# Patient Record
Sex: FEMALE | NOT HISPANIC OR LATINO | URBAN - METROPOLITAN AREA
[De-identification: names, ages, dates, MRNs, and addresses within clinical notes are randomized per-mention and may not be internally consistent; named-entity substitution may affect disease eponyms.]

---

## 2019-10-15 ENCOUNTER — EMERGENCY (EMERGENCY)
Facility: HOSPITAL | Age: 44
LOS: 1 days | Discharge: HOME | End: 2019-10-15
Attending: HOSPITALIST | Admitting: HOSPITALIST
Payer: COMMERCIAL

## 2019-10-15 VITALS
TEMPERATURE: 98 F | RESPIRATION RATE: 16 BRPM | DIASTOLIC BLOOD PRESSURE: 70 MMHG | HEART RATE: 74 BPM | OXYGEN SATURATION: 100 % | SYSTOLIC BLOOD PRESSURE: 128 MMHG

## 2019-10-15 PROCEDURE — 99284 EMERGENCY DEPT VISIT MOD MDM: CPT | Mod: 25

## 2019-10-15 PROCEDURE — 93010 ELECTROCARDIOGRAM REPORT: CPT

## 2019-10-15 RX ORDER — SODIUM CHLORIDE 9 MG/ML
1000 INJECTION INTRAMUSCULAR; INTRAVENOUS; SUBCUTANEOUS ONCE
Refills: 0 | Status: COMPLETED | OUTPATIENT
Start: 2019-10-15 | End: 2019-10-15

## 2019-10-15 RX ORDER — ONDANSETRON 8 MG/1
4 TABLET, FILM COATED ORAL ONCE
Refills: 0 | Status: COMPLETED | OUTPATIENT
Start: 2019-10-15 | End: 2019-10-15

## 2019-10-15 RX ORDER — MECLIZINE HCL 12.5 MG
25 TABLET ORAL ONCE
Refills: 0 | Status: COMPLETED | OUTPATIENT
Start: 2019-10-15 | End: 2019-10-15

## 2019-10-15 RX ADMIN — Medication 25 MILLIGRAM(S): at 23:34

## 2019-10-15 RX ADMIN — SODIUM CHLORIDE 1000 MILLILITER(S): 9 INJECTION INTRAMUSCULAR; INTRAVENOUS; SUBCUTANEOUS at 23:33

## 2019-10-15 RX ADMIN — ONDANSETRON 4 MILLIGRAM(S): 8 TABLET, FILM COATED ORAL at 23:34

## 2019-10-15 NOTE — ED PROVIDER NOTE - OBJECTIVE STATEMENT
44F with hx of hypothyroidism p/w c/o dizziness and vomiting. patient states she feels like she is spinning. has 2 brief episodes earlier today which resolved spontaneously. symptoms returned this evening, felt very dizzy and vomited 4 times. symptoms worse when walking and turning her head, improved with eyes closed. no recent uri, ringing in the ears, similar sx in the past. denies any cp, sob.

## 2019-10-15 NOTE — ED PROVIDER NOTE - PATIENT PORTAL LINK FT
You can access the FollowMyHealth Patient Portal offered by  by registering at the following website: [email or web address]. By joining [facility]’s FollowMyHealth portal, you will also be able to view your health information using other applications (apps) compatible with our system.

## 2019-10-15 NOTE — ED PROVIDER NOTE - NEUROLOGICAL, MLM
Alert and oriented, no focal deficits, no motor or sensory deficits. increased dizziness when turning her head to the right

## 2019-10-15 NOTE — ED ADULT TRIAGE NOTE - CHIEF COMPLAINT QUOTE
Pt comes in for c/o dizziness and vomiting that began about 6p tonight, pt denies any sickness prior to today. Pt reporting feeling chills and is afebrile in triage. Pt appears in no acute distress, vs as noted and EKG in progress.

## 2019-10-15 NOTE — ED PROVIDER NOTE - NSFOLLOWUPINSTRUCTIONS_ED_ALL_ED_FT
Advance activity as tolerated.  Continue all previously prescribed medications as directed unless otherwise instructed.  Follow up with your primary care physician in 48-72 hours- bring copies of your results.  Return to the ER for worsening or persistent symptoms, and/or ANY NEW OR CONCERNING SYMPTOMS. If you have issues obtaining follow up, please call: [phone] to obtain a doctor or specialist who takes your insurance in your area.  You may call [phone] to make an appointment with the internal medicine clinic.

## 2019-10-15 NOTE — ED PROVIDER NOTE - PROGRESS NOTE DETAILS
Sign out report received from Dr. Carballo, patient feeling much better, VSS, in NAD, pt stale to be discharged from ED. Kait: I personally saw the patient with the PA, and completed the key components of the history and physical exam. I then discussed the management plan with the PA.

## 2019-10-16 VITALS
OXYGEN SATURATION: 100 % | HEART RATE: 68 BPM | TEMPERATURE: 98 F | DIASTOLIC BLOOD PRESSURE: 60 MMHG | SYSTOLIC BLOOD PRESSURE: 105 MMHG | RESPIRATION RATE: 16 BRPM

## 2019-10-16 RX ORDER — MECLIZINE HCL 12.5 MG
1 TABLET ORAL
Qty: 15 | Refills: 0
Start: 2019-10-16 | End: 2019-10-20

## 2019-11-12 PROBLEM — E03.9 HYPOTHYROIDISM, UNSPECIFIED: Chronic | Status: ACTIVE | Noted: 2019-10-15

## 2019-11-13 ENCOUNTER — APPOINTMENT (OUTPATIENT)
Dept: PAIN MANAGEMENT | Facility: CLINIC | Age: 44
End: 2019-11-13
Payer: COMMERCIAL

## 2019-11-13 VITALS
SYSTOLIC BLOOD PRESSURE: 131 MMHG | HEIGHT: 65 IN | DIASTOLIC BLOOD PRESSURE: 82 MMHG | BODY MASS INDEX: 28.16 KG/M2 | HEART RATE: 67 BPM | WEIGHT: 169 LBS

## 2019-11-13 DIAGNOSIS — R51 HEADACHE: ICD-10-CM

## 2019-11-13 PROCEDURE — 99204 OFFICE O/P NEW MOD 45 MIN: CPT

## 2019-11-13 RX ORDER — LEVOTHYROXINE SODIUM 0.07 MG/1
75 TABLET ORAL
Refills: 0 | Status: ACTIVE | COMMUNITY

## 2019-11-15 NOTE — HISTORY OF PRESENT ILLNESS
[FreeTextEntry1] : 43 y/o female for one month. Never had headaches in past. One month ago without incident - while urinating in bathroom - felt dx - vertigo - vomited multiple times. Went to ER DX Vertigo no imaging. The following day she began co bad headaches - top of head throbbing, no nausea, vomiting, pos for photo and phonophobia. Associated neck pain. \par Took topamax for 8 days at 50 mgs and rizatriptan 10 mgs tid.\par No aura. No concussion\par More recently patient; reports sx s unchanged. \par MRI / A brain neg.\par \par No prior ho headaches\par Lives with  and children\par No stressors\par Menses q month\par No family ho migraines

## 2019-11-15 NOTE — ASSESSMENT
[FreeTextEntry1] : Chronic migraines\par MRI/A neg\par Non focal exam\par Would rec tizanidine 2 mgs qhs to be increased depending upon her response. Advised of AE's. \par DC rizatriptan

## 2019-11-15 NOTE — PHYSICAL EXAM
[General Appearance - Alert] : alert [Oriented To Time, Place, And Person] : oriented to person, place, and time [General Appearance - In No Acute Distress] : in no acute distress [Impaired Insight] : insight and judgment were intact [Affect] : the affect was normal [Person] : oriented to person [Place] : oriented to place [Time] : oriented to time [Concentration Intact] : normal concentrating ability [Naming Objects] : no difficulty naming common objects [Fluency] : fluency intact [Comprehension] : comprehension intact [Past History] : adequate knowledge of personal past history [Cranial Nerves Optic (II)] : visual acuity intact bilaterally,  visual fields full to confrontation, pupils equal round and reactive to light [Cranial Nerves Oculomotor (III)] : extraocular motion intact [Cranial Nerves Trigeminal (V)] : facial sensation intact symmetrically [Cranial Nerves Facial (VII)] : face symmetrical [Cranial Nerves Vestibulocochlear (VIII)] : hearing was intact bilaterally [Cranial Nerves Glossopharyngeal (IX)] : tongue and palate midline [Cranial Nerves Accessory (XI - Cranial And Spinal)] : head turning and shoulder shrug symmetric [Cranial Nerves Hypoglossal (XII)] : there was no tongue deviation with protrusion [Motor Tone] : muscle tone was normal in all four extremities [Motor Strength] : muscle strength was normal in all four extremities [No Muscle Atrophy] : normal bulk in all four extremities [Sensation Tactile Decrease] : light touch was intact [Abnormal Walk] : normal gait [Balance] : balance was intact [2+] : Ankle jerk left 2+ [Nail Clubbing] : no clubbing  or cyanosis of the fingernails [Sclera] : the sclera and conjunctiva were normal [Outer Ear] : the ears and nose were normal in appearance [Neck Appearance] : the appearance of the neck was normal [] : no respiratory distress [Past-pointing] : there was no past-pointing [Tremor] : no tremor present [Plantar Reflex Right Only] : normal on the right [Plantar Reflex Left Only] : normal on the left

## 2019-11-20 ENCOUNTER — APPOINTMENT (OUTPATIENT)
Dept: NEUROLOGY | Facility: CLINIC | Age: 44
End: 2019-11-20
Payer: COMMERCIAL

## 2019-11-20 PROCEDURE — 93886 INTRACRANIAL COMPLETE STUDY: CPT

## 2019-11-20 PROCEDURE — 93892 TCD EMBOLI DETECT W/O INJ: CPT

## 2019-12-11 ENCOUNTER — APPOINTMENT (OUTPATIENT)
Dept: PAIN MANAGEMENT | Facility: CLINIC | Age: 44
End: 2019-12-11
Payer: COMMERCIAL

## 2019-12-11 PROCEDURE — 99214 OFFICE O/P EST MOD 30 MIN: CPT

## 2019-12-15 NOTE — ASSESSMENT
[FreeTextEntry1] : Chronic migraines\par Non focal exam\par Would rec increase tizanidine 2 mgs 1 bid  to be increased depending upon her response. Advised of AE's. \par Will try PT.\par

## 2019-12-15 NOTE — PHYSICAL EXAM
[General Appearance - Alert] : alert [General Appearance - In No Acute Distress] : in no acute distress [Oriented To Time, Place, And Person] : oriented to person, place, and time [Impaired Insight] : insight and judgment were intact [Affect] : the affect was normal [Person] : oriented to person [Place] : oriented to place [Time] : oriented to time [Concentration Intact] : normal concentrating ability [Naming Objects] : no difficulty naming common objects [Comprehension] : comprehension intact [Fluency] : fluency intact [Past History] : adequate knowledge of personal past history [Cranial Nerves Optic (II)] : visual acuity intact bilaterally,  visual fields full to confrontation, pupils equal round and reactive to light [Cranial Nerves Oculomotor (III)] : extraocular motion intact [Cranial Nerves Trigeminal (V)] : facial sensation intact symmetrically [Cranial Nerves Facial (VII)] : face symmetrical [Cranial Nerves Vestibulocochlear (VIII)] : hearing was intact bilaterally [Cranial Nerves Glossopharyngeal (IX)] : tongue and palate midline [Cranial Nerves Hypoglossal (XII)] : there was no tongue deviation with protrusion [Motor Tone] : muscle tone was normal in all four extremities [Cranial Nerves Accessory (XI - Cranial And Spinal)] : head turning and shoulder shrug symmetric [No Muscle Atrophy] : normal bulk in all four extremities [Motor Strength] : muscle strength was normal in all four extremities [Sensation Tactile Decrease] : light touch was intact [Abnormal Walk] : normal gait [Balance] : balance was intact [2+] : Ankle jerk right 2+ [Sclera] : the sclera and conjunctiva were normal [Outer Ear] : the ears and nose were normal in appearance [Nail Clubbing] : no clubbing  or cyanosis of the fingernails [Neck Appearance] : the appearance of the neck was normal [] : no rash [Past-pointing] : there was no past-pointing [Tremor] : no tremor present [Plantar Reflex Right Only] : normal on the right [Plantar Reflex Left Only] : normal on the left

## 2019-12-15 NOTE — HISTORY OF PRESENT ILLNESS
[FreeTextEntry1] : Patient reports headaches have slightly improved with current regime. Instead of qd headache now its on and off ... more in morning. Medrol has no effect

## 2020-02-13 ENCOUNTER — APPOINTMENT (OUTPATIENT)
Dept: PAIN MANAGEMENT | Facility: CLINIC | Age: 45
End: 2020-02-13
Payer: COMMERCIAL

## 2020-02-13 VITALS
SYSTOLIC BLOOD PRESSURE: 116 MMHG | BODY MASS INDEX: 28.32 KG/M2 | HEIGHT: 65 IN | HEART RATE: 93 BPM | WEIGHT: 170 LBS | DIASTOLIC BLOOD PRESSURE: 70 MMHG

## 2020-02-13 PROCEDURE — 99214 OFFICE O/P EST MOD 30 MIN: CPT

## 2020-02-13 NOTE — PHYSICAL EXAM
[General Appearance - Alert] : alert [Affect] : the affect was normal [Person] : oriented to person [Place] : oriented to place [Time] : oriented to time [Sclera] : the sclera and conjunctiva were normal [Outer Ear] : the ears and nose were normal in appearance [Neck Appearance] : the appearance of the neck was normal [] : no rash

## 2020-02-16 NOTE — HISTORY OF PRESENT ILLNESS
[FreeTextEntry1] : Patient reports headaches are much better since PT. No new medical problems. Off meds

## 2020-02-16 NOTE — ASSESSMENT
[FreeTextEntry1] : Chronic migraines\par Non focal exam\par Will advise patient to initiate Pamelor 10mgs qhs   \par Advised of AE

## 2021-01-07 ENCOUNTER — APPOINTMENT (OUTPATIENT)
Dept: SURGICAL ONCOLOGY | Facility: CLINIC | Age: 46
End: 2021-01-07
Payer: COMMERCIAL

## 2021-01-07 VITALS
WEIGHT: 175 LBS | BODY MASS INDEX: 29.16 KG/M2 | OXYGEN SATURATION: 99 % | DIASTOLIC BLOOD PRESSURE: 83 MMHG | RESPIRATION RATE: 16 BRPM | HEIGHT: 65 IN | SYSTOLIC BLOOD PRESSURE: 134 MMHG | HEART RATE: 86 BPM

## 2021-01-07 PROCEDURE — 99072 ADDL SUPL MATRL&STAF TM PHE: CPT

## 2021-01-07 PROCEDURE — 99244 OFF/OP CNSLTJ NEW/EST MOD 40: CPT

## 2021-01-07 NOTE — CONSULT LETTER
[Dear  ___] : Dear  [unfilled], [Consult Letter:] : I had the pleasure of evaluating your patient, [unfilled]. [Please see my note below.] : Please see my note below. [Consult Closing:] : Thank you very much for allowing me to participate in the care of this patient.  If you have any questions, please do not hesitate to contact me. [Sincerely,] : Sincerely, [FreeTextEntry2] : Nelida Bruce MD [FreeTextEntry3] : Vladimir Miller MD\par Surgical Oncology\par [facility]\par Office: [phone]\par Cell: [phone]\par

## 2021-01-07 NOTE — PHYSICAL EXAM
[Normal Supraclavicular Lymph Nodes] : normal supraclavicular lymph nodes [Normal Axillary Lymph Nodes] : normal axillary lymph nodes [Normal] : oriented to person, place and time, with appropriate affect [FreeTextEntry1] : . [Normal] : normal breast inspection and palpation of axillas

## 2021-01-07 NOTE — HISTORY OF PRESENT ILLNESS
[de-identified] : Essence is a pleasant 45 year-old female here for an initial consultation.  She was previously seen by a breast surgeon in 2013 for evaluation of a left breast mass, ultimately biopsied and found to be consistent with a fibroadenoma.  Most recent breast imaging was completed on 11/25/20.  There was no mammographic evidence of malignancy.  Ultrasound revealed a 1.1 cm solid nodule in the left breast at 12:00, 1 cmfn, previously measuring 9 mm.  Given interval enlargement, ultrasound-guided biopsy is recommended (BIRADS 4).  Unclear if this corresponds to the previously biopsied nodule.\par \par Her past medical history includes chronic migraines, cervical spine pain and hypothyroidism.  She denies any family history of breast or ovarian cancer.  She denies alcohol or tobacco use.  She denies any palpable breast mass, nipple discharge, inversion, pain or skin changes.

## 2021-01-07 NOTE — OB HISTORY
[Menarche Age ____] : menarche age [unfilled] [Definite ___ (Date)] : the last menstrual period was [unfilled] [Total Preg ___] : G[unfilled] [Live Births ___] : P[unfilled]  [FreeTextEntry2] : age 24 at first pregnancy

## 2021-01-07 NOTE — ASSESSMENT
[FreeTextEntry1] : We discussed the need for an ultrasound-guided core needle biopsy left breast BI-RADS 4 findings.  We will arrange for this to occur. Essence will contact me upon completion discuss pertinent findings.

## 2021-01-22 ENCOUNTER — NON-APPOINTMENT (OUTPATIENT)
Age: 46
End: 2021-01-22

## 2021-11-29 ENCOUNTER — APPOINTMENT (OUTPATIENT)
Dept: SURGICAL ONCOLOGY | Facility: CLINIC | Age: 46
End: 2021-11-29
Payer: MEDICAID

## 2021-11-29 VITALS
HEART RATE: 75 BPM | SYSTOLIC BLOOD PRESSURE: 124 MMHG | TEMPERATURE: 98.1 F | DIASTOLIC BLOOD PRESSURE: 74 MMHG | HEIGHT: 65 IN | WEIGHT: 170 LBS | BODY MASS INDEX: 28.32 KG/M2 | RESPIRATION RATE: 16 BRPM | OXYGEN SATURATION: 95 %

## 2021-11-29 PROCEDURE — 99214 OFFICE O/P EST MOD 30 MIN: CPT

## 2021-11-29 NOTE — HISTORY OF PRESENT ILLNESS
[de-identified] : Essence is a pleasant 46 year-old female here for a follow up. She was previously seen by a breast surgeon in 2013 for evaluation of a left breast mass, ultimately biopsied and found to be consistent with a fibroadenoma.  Most recent breast imaging was completed on 11/25/20.  There was no mammographic evidence of malignancy.  Ultrasound revealed a 1.1 cm solid nodule in the left breast at 12:00, 1 cmfn, previously measuring 9 mm. Given interval enlargement, ultrasound-guided biopsy is recommended (BIRADS 4).  Unclear if this corresponds to the previously biopsied nodule.\par \par I reviewed her care with a breast radiologist, Dr. Correa, lesion has been stable when compared to 2015 imaging. Advised that there would be no need for tissue sampling and recommended repeat US in 6 months. \par \par She reports having a  left mammogram in August 2021 with dense breasts limiting sensitive of exam, no change from prior study dated 11/25/20, no mammographic evidence of malignancy in left breast, BI-RADS 2. Left breast ultrasound revealed, 1.1 cm shadowing solid nodule at left 12:00, US-Guided biopsy suggested, BI-RADS 4. \par \par Her past medical history includes chronic migraines, cervical spine pain and hypothyroidism.  She denies any family history of breast or ovarian cancer.  She denies alcohol or tobacco use.  She denies any palpable breast mass, nipple discharge, inversion, pain or skin changes.

## 2021-11-29 NOTE — PHYSICAL EXAM
[Normal] : supple, no neck mass and thyroid not enlarged [Normal Supraclavicular Lymph Nodes] : normal supraclavicular lymph nodes [Normal Axillary Lymph Nodes] : normal axillary lymph nodes [Normal] : oriented to person, place and time, with appropriate affect [FreeTextEntry1] : SM present during exam.  [de-identified] : Complete breast exam performed in supine and upright positions. No palpable masses, tenderness, nipple discharge, inversion, deviation or enlarged axillary or supraclavicular lymph nodes.

## 2021-11-29 NOTE — ASSESSMENT
[FreeTextEntry1] : We discussed the need for right-sided breast imaging with a mammogram and sonogram to be completed in the near future.  Repeat imaging completed at [facility] is again recommending an ultrasound-guided core biopsy of the left breast.  Essence and her son will contact me upon completion of the imaging and biopsy to discuss pertinent findings.

## 2021-11-29 NOTE — CONSULT LETTER
[Dear  ___] : Dear  [unfilled], [Consult Letter:] : I had the pleasure of evaluating your patient, [unfilled]. [Please see my note below.] : Please see my note below. [Consult Closing:] : Thank you very much for allowing me to participate in the care of this patient.  If you have any questions, please do not hesitate to contact me. [Sincerely,] : Sincerely, [FreeTextEntry2] : Nelida Bruce MD [FreeTextEntry3] : Vladimir Miller MD\par Surgical Oncology\par [facility]\par Office: [phone]\par Cell: [phone]\par

## 2022-01-10 ENCOUNTER — RESULT REVIEW (OUTPATIENT)
Age: 47
End: 2022-01-10

## 2022-01-10 ENCOUNTER — OUTPATIENT (OUTPATIENT)
Dept: OUTPATIENT SERVICES | Facility: HOSPITAL | Age: 47
LOS: 1 days | End: 2022-01-10
Payer: MEDICAID

## 2022-01-10 ENCOUNTER — APPOINTMENT (OUTPATIENT)
Dept: ULTRASOUND IMAGING | Facility: IMAGING CENTER | Age: 47
End: 2022-01-10
Payer: MEDICAID

## 2022-01-10 DIAGNOSIS — N63.20 UNSPECIFIED LUMP IN THE LEFT BREAST, UNSPECIFIED QUADRANT: ICD-10-CM

## 2022-01-10 PROCEDURE — 77065 DX MAMMO INCL CAD UNI: CPT

## 2022-01-10 PROCEDURE — 88305 TISSUE EXAM BY PATHOLOGIST: CPT

## 2022-01-10 PROCEDURE — 19083 BX BREAST 1ST LESION US IMAG: CPT

## 2022-01-10 PROCEDURE — 19083 BX BREAST 1ST LESION US IMAG: CPT | Mod: LT

## 2022-01-10 PROCEDURE — 88305 TISSUE EXAM BY PATHOLOGIST: CPT | Mod: 26

## 2022-01-10 PROCEDURE — 77065 DX MAMMO INCL CAD UNI: CPT | Mod: 26,LT

## 2022-01-10 PROCEDURE — A4648: CPT

## 2022-01-11 LAB — SURGICAL PATHOLOGY STUDY: SIGNIFICANT CHANGE UP

## 2022-03-09 ENCOUNTER — RESULT REVIEW (OUTPATIENT)
Age: 47
End: 2022-03-09

## 2022-03-09 ENCOUNTER — OUTPATIENT (OUTPATIENT)
Dept: OUTPATIENT SERVICES | Facility: HOSPITAL | Age: 47
LOS: 1 days | End: 2022-03-09
Payer: MEDICAID

## 2022-03-09 ENCOUNTER — APPOINTMENT (OUTPATIENT)
Dept: ULTRASOUND IMAGING | Facility: IMAGING CENTER | Age: 47
End: 2022-03-09
Payer: MEDICAID

## 2022-03-09 ENCOUNTER — APPOINTMENT (OUTPATIENT)
Dept: MAMMOGRAPHY | Facility: IMAGING CENTER | Age: 47
End: 2022-03-09
Payer: MEDICAID

## 2022-03-09 DIAGNOSIS — Z00.8 ENCOUNTER FOR OTHER GENERAL EXAMINATION: ICD-10-CM

## 2022-03-09 PROCEDURE — 76641 ULTRASOUND BREAST COMPLETE: CPT

## 2022-03-09 PROCEDURE — 76641 ULTRASOUND BREAST COMPLETE: CPT | Mod: 26,RT

## 2022-03-09 PROCEDURE — 77063 BREAST TOMOSYNTHESIS BI: CPT | Mod: 26,52

## 2022-03-09 PROCEDURE — 77067 SCR MAMMO BI INCL CAD: CPT | Mod: 26,52

## 2022-03-09 PROCEDURE — G0279: CPT

## 2022-03-09 PROCEDURE — 77067 SCR MAMMO BI INCL CAD: CPT

## 2022-03-09 PROCEDURE — 77063 BREAST TOMOSYNTHESIS BI: CPT

## 2022-03-09 PROCEDURE — 77065 DX MAMMO INCL CAD UNI: CPT

## 2022-05-04 ENCOUNTER — NON-APPOINTMENT (OUTPATIENT)
Age: 47
End: 2022-05-04

## 2022-06-08 ENCOUNTER — NON-APPOINTMENT (OUTPATIENT)
Age: 47
End: 2022-06-08

## 2022-09-12 ENCOUNTER — RESULT REVIEW (OUTPATIENT)
Age: 47
End: 2022-09-12

## 2023-03-13 ENCOUNTER — APPOINTMENT (OUTPATIENT)
Dept: OBGYN | Facility: CLINIC | Age: 48
End: 2023-03-13
Payer: COMMERCIAL

## 2023-03-13 VITALS
SYSTOLIC BLOOD PRESSURE: 145 MMHG | BODY MASS INDEX: 27.99 KG/M2 | HEIGHT: 65 IN | WEIGHT: 168 LBS | DIASTOLIC BLOOD PRESSURE: 83 MMHG

## 2023-03-13 DIAGNOSIS — R10.84 GENERALIZED ABDOMINAL PAIN: ICD-10-CM

## 2023-03-13 DIAGNOSIS — N92.1 EXCESSIVE AND FREQUENT MENSTRUATION WITH IRREGULAR CYCLE: ICD-10-CM

## 2023-03-13 PROCEDURE — 99202 OFFICE O/P NEW SF 15 MIN: CPT

## 2023-03-13 NOTE — HISTORY OF PRESENT ILLNESS
[FreeTextEntry1] : pt is a 48 y/o p2 lmp 2/25 presents for new pt visit for diffuse abdominal and pelvic pain x 3 weeks had abdominal and transvaginal sono which showed multiple fibroids otherwise normal, also complains of recent menorrhagia

## 2023-03-14 ENCOUNTER — APPOINTMENT (OUTPATIENT)
Dept: CT IMAGING | Facility: CLINIC | Age: 48
End: 2023-03-14
Payer: COMMERCIAL

## 2023-03-14 ENCOUNTER — RESULT REVIEW (OUTPATIENT)
Age: 48
End: 2023-03-14

## 2023-03-14 PROCEDURE — 74177 CT ABD & PELVIS W/CONTRAST: CPT

## 2023-03-17 ENCOUNTER — NON-APPOINTMENT (OUTPATIENT)
Age: 48
End: 2023-03-17

## 2023-03-21 ENCOUNTER — APPOINTMENT (OUTPATIENT)
Dept: OBGYN | Facility: CLINIC | Age: 48
End: 2023-03-21
Payer: COMMERCIAL

## 2023-03-21 VITALS
WEIGHT: 168 LBS | SYSTOLIC BLOOD PRESSURE: 128 MMHG | HEIGHT: 65 IN | BODY MASS INDEX: 27.99 KG/M2 | DIASTOLIC BLOOD PRESSURE: 80 MMHG

## 2023-03-21 DIAGNOSIS — R10.9 UNSPECIFIED ABDOMINAL PAIN: ICD-10-CM

## 2023-03-21 PROCEDURE — 99213 OFFICE O/P EST LOW 20 MIN: CPT

## 2023-03-21 NOTE — HISTORY OF PRESENT ILLNESS
[FreeTextEntry1] : pt presents for follow up visit after ct abdomen performed for right sided pain exacerbated by movement , with 6cm subserosal left fundal fibroid noted , son translating on speaker phone

## 2023-03-21 NOTE — PLAN
[FreeTextEntry1] : discussed with patient f/u colonoscopy uncertain if fibroid is the source of her pain, appears it could be musculoskeletal but will continue to evaluate if colonoscopy normal and pain persists patient will return for f/u visit

## 2023-03-23 ENCOUNTER — RESULT REVIEW (OUTPATIENT)
Age: 48
End: 2023-03-23

## 2023-03-23 ENCOUNTER — APPOINTMENT (OUTPATIENT)
Dept: ULTRASOUND IMAGING | Facility: IMAGING CENTER | Age: 48
End: 2023-03-23
Payer: COMMERCIAL

## 2023-03-23 ENCOUNTER — APPOINTMENT (OUTPATIENT)
Dept: MAMMOGRAPHY | Facility: IMAGING CENTER | Age: 48
End: 2023-03-23
Payer: COMMERCIAL

## 2023-03-23 ENCOUNTER — OUTPATIENT (OUTPATIENT)
Dept: OUTPATIENT SERVICES | Facility: HOSPITAL | Age: 48
LOS: 1 days | End: 2023-03-23
Payer: COMMERCIAL

## 2023-03-23 DIAGNOSIS — N63.20 UNSPECIFIED LUMP IN THE LEFT BREAST, UNSPECIFIED QUADRANT: ICD-10-CM

## 2023-03-23 PROCEDURE — 76641 ULTRASOUND BREAST COMPLETE: CPT | Mod: 26,50

## 2023-03-23 PROCEDURE — 77063 BREAST TOMOSYNTHESIS BI: CPT | Mod: 26

## 2023-03-23 PROCEDURE — 77063 BREAST TOMOSYNTHESIS BI: CPT

## 2023-03-23 PROCEDURE — 77067 SCR MAMMO BI INCL CAD: CPT | Mod: 26

## 2023-03-23 PROCEDURE — 77067 SCR MAMMO BI INCL CAD: CPT

## 2023-03-23 PROCEDURE — 76641 ULTRASOUND BREAST COMPLETE: CPT

## 2023-03-27 ENCOUNTER — APPOINTMENT (OUTPATIENT)
Dept: SURGICAL ONCOLOGY | Facility: CLINIC | Age: 48
End: 2023-03-27
Payer: COMMERCIAL

## 2023-03-27 VITALS
OXYGEN SATURATION: 99 % | WEIGHT: 168 LBS | HEART RATE: 74 BPM | BODY MASS INDEX: 27.99 KG/M2 | SYSTOLIC BLOOD PRESSURE: 127 MMHG | RESPIRATION RATE: 15 BRPM | HEIGHT: 65 IN | DIASTOLIC BLOOD PRESSURE: 83 MMHG

## 2023-03-27 PROCEDURE — 99214 OFFICE O/P EST MOD 30 MIN: CPT

## 2023-03-27 NOTE — CONSULT LETTER
[Dear  ___] : Dear  [unfilled], [Consult Letter:] : I had the pleasure of evaluating your patient, [unfilled]. [Please see my note below.] : Please see my note below. [Consult Closing:] : Thank you very much for allowing me to participate in the care of this patient.  If you have any questions, please do not hesitate to contact me. [Sincerely,] : Sincerely, [FreeTextEntry2] : Nelida Bruce MD [FreeTextEntry3] : Vladimir Miller MD\par Surgical Oncology\par [facility]\par Office: [phone]\par Cell: [phone]\par

## 2023-03-27 NOTE — PHYSICAL EXAM
[Normal] : supple, no neck mass and thyroid not enlarged [Normal Supraclavicular Lymph Nodes] : normal supraclavicular lymph nodes [Normal Axillary Lymph Nodes] : normal axillary lymph nodes [Normal] : oriented to person, place and time, with appropriate affect [FreeTextEntry1] : AB present during exam.  [de-identified] : Complete breast exam performed in supine and upright positions. No palpable masses, tenderness, nipple discharge, inversion, deviation or enlarged axillary or supraclavicular lymph nodes.

## 2023-03-27 NOTE — HISTORY OF PRESENT ILLNESS
[de-identified] : Essence is a pleasant 48 year-old female here for a follow up. She was previously seen by a breast surgeon in 2013 for evaluation of a left breast mass, ultimately biopsied and found to be consistent with a fibroadenoma.  She completed breast imaging was completed on 11/25/20.  There was no mammographic evidence of malignancy.  Ultrasound revealed a 1.1 cm solid nodule in the left breast at 12:00, 1 cmfn, previously measuring 9 mm. Given interval enlargement, ultrasound-guided biopsy is recommended (BIRADS 4).  Unclear if this corresponds to the previously biopsied nodule.\par \par I reviewed her care with a breast radiologist, Dr. Correa, lesion has been stable when compared to 2015 imaging. Advised that there would be no need for tissue sampling and recommended repeat US in 6 months. \par \par She reports having a  left mammogram in August 2021 with dense breasts limiting sensitive of exam, no change from prior study dated 11/25/20, no mammographic evidence of malignancy in left breast, BI-RADS 2. Left breast ultrasound revealed, 1.1 cm shadowing solid nodule at left 12:00, US-Guided biopsy suggested, BI-RADS 4. \par \par She underwent an ultrasound-guided biopsy of a left breast mass at 12:00, N+1 on 1/10/22 (this was previously biopsied in 2015 with benign pathology).  Pathology revealed a fibroadenoma (benign and concordant).  She subsequently underwent overdue right breast imaging (mammo and sono) on 3/9/22 with benign findings (BIRADS 2). \par \par Most recent bilateral mammogram and sonogram performed 3/23/23 demonstrated benign findings (BIRADS 2).\par \par Her past medical history includes chronic migraines, cervical spine pain and hypothyroidism.  She denies any family history of breast or ovarian cancer.  She denies alcohol or tobacco use.  \par \par She denies any palpable breast mass, nipple discharge, inversion, pain or skin changes.

## 2023-04-04 ENCOUNTER — APPOINTMENT (OUTPATIENT)
Dept: SURGICAL ONCOLOGY | Facility: AMBULATORY SURGERY CENTER | Age: 48
End: 2023-04-04

## 2023-12-18 ENCOUNTER — APPOINTMENT (OUTPATIENT)
Dept: OBGYN | Facility: CLINIC | Age: 48
End: 2023-12-18

## 2023-12-21 ENCOUNTER — APPOINTMENT (OUTPATIENT)
Dept: OBGYN | Facility: CLINIC | Age: 48
End: 2023-12-21
Payer: MEDICAID

## 2023-12-21 VITALS
HEART RATE: 77 BPM | SYSTOLIC BLOOD PRESSURE: 118 MMHG | HEIGHT: 65 IN | BODY MASS INDEX: 29.16 KG/M2 | DIASTOLIC BLOOD PRESSURE: 70 MMHG | WEIGHT: 175 LBS

## 2023-12-21 DIAGNOSIS — M54.2 CERVICALGIA: ICD-10-CM

## 2023-12-21 DIAGNOSIS — Z78.9 OTHER SPECIFIED HEALTH STATUS: ICD-10-CM

## 2023-12-21 DIAGNOSIS — G43.909 MIGRAINE, UNSPECIFIED, NOT INTRACTABLE, W/OUT STATUS MIGRAINOSUS: ICD-10-CM

## 2023-12-21 DIAGNOSIS — Z86.018 PERSONAL HISTORY OF OTHER BENIGN NEOPLASM: ICD-10-CM

## 2023-12-21 DIAGNOSIS — Z86.39 PERSONAL HISTORY OF OTHER ENDOCRINE, NUTRITIONAL AND METABOLIC DISEASE: ICD-10-CM

## 2023-12-21 PROCEDURE — 99215 OFFICE O/P EST HI 40 MIN: CPT | Mod: 25

## 2023-12-21 PROCEDURE — 58100 BIOPSY OF UTERUS LINING: CPT

## 2023-12-21 RX ORDER — METHYLPREDNISOLONE 4 MG/1
4 TABLET ORAL
Qty: 21 | Refills: 0 | Status: DISCONTINUED | COMMUNITY
Start: 2019-11-13 | End: 2023-12-21

## 2023-12-21 RX ORDER — NORTRIPTYLINE HYDROCHLORIDE 10 MG/1
10 CAPSULE ORAL
Qty: 30 | Refills: 2 | Status: DISCONTINUED | COMMUNITY
Start: 2020-02-13 | End: 2023-12-21

## 2023-12-21 RX ORDER — TIZANIDINE 2 MG/1
2 TABLET ORAL
Qty: 30 | Refills: 0 | Status: DISCONTINUED | COMMUNITY
Start: 2019-11-13 | End: 2023-12-21

## 2023-12-21 NOTE — REVIEW OF SYSTEMS
[Night Sweats] : night sweats [Abdominal Pain] : abdominal pain [Breast Lump] : breast lump [Back Pain] : back pain [Negative] : Heme/Lymph

## 2023-12-22 PROBLEM — Z86.018 HISTORY OF FIBROADENOMA OF BREAST: Status: RESOLVED | Noted: 2023-12-22 | Resolved: 2023-12-22

## 2023-12-22 LAB
BASOPHILS # BLD AUTO: 0.06 K/UL
BASOPHILS NFR BLD AUTO: 0.8 %
EOSINOPHIL # BLD AUTO: 0.1 K/UL
EOSINOPHIL NFR BLD AUTO: 1.3 %
HCT VFR BLD CALC: 35.4 %
HGB BLD-MCNC: 11.3 G/DL
IMM GRANULOCYTES NFR BLD AUTO: 0.3 %
LYMPHOCYTES # BLD AUTO: 2.43 K/UL
LYMPHOCYTES NFR BLD AUTO: 30.9 %
MAN DIFF?: NORMAL
MCHC RBC-ENTMCNC: 26.1 PG
MCHC RBC-ENTMCNC: 31.9 GM/DL
MCV RBC AUTO: 81.8 FL
MONOCYTES # BLD AUTO: 0.68 K/UL
MONOCYTES NFR BLD AUTO: 8.6 %
NEUTROPHILS # BLD AUTO: 4.58 K/UL
NEUTROPHILS NFR BLD AUTO: 58.1 %
PLATELET # BLD AUTO: 258 K/UL
PROLACTIN SERPL-MCNC: 24.8 NG/ML
RBC # BLD: 4.33 M/UL
RBC # FLD: 14.3 %
TSH SERPL-ACNC: 2.28 UIU/ML
WBC # FLD AUTO: 7.87 K/UL

## 2023-12-22 NOTE — PROCEDURE
[Endometrial Biopsy] : Endometrial biopsy [Time out performed] : Pre-procedure time out performed.  Patient's name, date of birth and procedure confirmed. [Consent Obtained] : Consent obtained [Irregular Bleeding] : irregular uterine bleeding [Risks] : risks [Benefits] : benefits [Alternatives] : alternatives [Infection] : infection [Pain] : pain [Negative] : negative pregnancy test [No Premedication] : No premedication [Betadine] : Betadine [Tenaculum] : Tenaculum [Required Dilation] : required dilation [Sounded to ___ cm] : sounded to [unfilled] ~Ucm [Anteverted] : anteverted [Moderate] : moderate [Specimen Collected] : collected [Sent to Pathology] : placed in buffered formalin and sent for pathology [Tolerated Well] : Patient tolerated the procedure well [No Complications] : No complications [Bleeding] : excessive bleeding was noted [LMPDate] : 11/26/23 [de-identified] : os finder used  [de-identified] : controlled

## 2023-12-22 NOTE — PLAN
[FreeTextEntry1] : Educated pt on fibroids and AUB - documentation given for her to read and better review, images shown, pt reassured as she thought she has cancer Her son was present and helped tranlsate for some portions EMB obtained Rev medical management, UAE option and hysterectomy option and r/b of all tx Add CBC, TSH, PRL as these were not with her bloodwork she brought in Records reviewed of prior notes and reports she brought in  RTO after results available to redisucss tx plan Can use OTC analgesics for pain, rev with patient  She was reassured  Of note visit took 90 minutes as she was undecided about proceeding with biopsy, needed to consult with  and son and we had to await for her to give urine sample for EMB  Will need colonoscopy referral on f/u

## 2023-12-22 NOTE — RESULTS/DATA
[TextEntry] : Pelvic u/s 12/2023  Uterus 9 x 8 x 6 cm, EMS 9 mm, normal cervix, normal ovaries, no free fluid, multiple fibroids, heterogeneous  myometrium,   CT abd pelvis 3/2023   FINDINGS: LOWER CHEST: Within normal limits.  LIVER: Within normal limits. BILE DUCTS: Normal caliber. GALLBLADDER: Within normal limits. SPLEEN: Within normal limits. PANCREAS: Within normal limits. ADRENALS: Within normal limits. KIDNEYS/URETERS: 5 mm angiomyolipoma left kidney. No hydronephrosis or calculi  BLADDER: Within normal limits. REPRODUCTIVE ORGANS: Myomatous uterus with a subserosal left fundal 6 cm myoma.  BOWEL: No bowel obstruction. Appendix is normal. PERITONEUM: No ascites. VESSELS: Within normal limits. RETROPERITONEUM/LYMPH NODES: No lymphadenopathy. ABDOMINAL WALL: Within normal limits. BONES: Degenerative disc changes L5-S1  IMPRESSION: Etiology for the patient's pain not identified.  Myomatous uterus.  Small left renal angiomyolipoma  Abd u/s 3/2023 Normal abdominal eval Uterus anteverted 12 x 6 x 7 cm, 3 uterine fibroids measuring largest left lateral uterine body intramural/subserosal 5 x 4 x 6 cm, 2 smaller anterior, EMS normal 0.3 cm, R and L ovary normal and small, no free fluid body/fundus/subserosal 2 x1 x 2 cm and 2 x 1 x 2 cm   Pelvic u/s

## 2023-12-22 NOTE — PHYSICAL EXAM
[Chaperone Present] : A chaperone was present in the examining room during all aspects of the physical examination [Appropriately responsive] : appropriately responsive [Alert] : alert [Non-distended] : non-distended [Soft] : soft [FreeTextEntry7] : +tender in lower abdomen in midline and L>R, palpable fibrids under umbilicus  [Labia Majora] : normal [Labia Minora] : normal [No Bleeding] : There was no active vaginal bleeding [Normal] : normal [Tenderness] : tender [Enlarged ___ wks] : enlarged [unfilled] ~Uweeks [Anteversion] : anteverted [Mass ___ cm] : a [unfilled] ~Ucm uterine mass was palpated [Uterine Adnexae] : normal [FreeTextEntry6] : Tender in fundal area and L>R, irregular and enlarged, mobile Solaraze Counseling:  I discussed with the patient the risks of Solaraze including but not limited to erythema, scaling, itching, weeping, crusting, and pain.

## 2023-12-22 NOTE — HISTORY OF PRESENT ILLNESS
[Patient reported PAP Smear was normal] : Patient reported PAP Smear was normal [Frequency: Q ___ days] : menstrual periods occur approximately every [unfilled] days [Menarche Age: ____] : age at menarche was [unfilled] [Currently Active] : currently active [Men] : men [Mammogramdate] : 03/23 [PapSmeardate] : 09/22 [FreeTextEntry1] : 11/26/23

## 2024-01-08 LAB — CORE LAB BIOPSY: NORMAL

## 2024-01-18 ENCOUNTER — APPOINTMENT (OUTPATIENT)
Dept: OBGYN | Facility: CLINIC | Age: 49
End: 2024-01-18
Payer: MEDICAID

## 2024-01-18 VITALS
SYSTOLIC BLOOD PRESSURE: 124 MMHG | BODY MASS INDEX: 29.16 KG/M2 | WEIGHT: 175 LBS | DIASTOLIC BLOOD PRESSURE: 80 MMHG | HEIGHT: 65 IN

## 2024-01-18 DIAGNOSIS — N92.0 EXCESSIVE AND FREQUENT MENSTRUATION WITH REGULAR CYCLE: ICD-10-CM

## 2024-01-18 DIAGNOSIS — R10.2 PELVIC AND PERINEAL PAIN: ICD-10-CM

## 2024-01-18 DIAGNOSIS — D25.9 LEIOMYOMA OF UTERUS, UNSPECIFIED: ICD-10-CM

## 2024-01-18 DIAGNOSIS — N94.6 DYSMENORRHEA, UNSPECIFIED: ICD-10-CM

## 2024-01-18 PROCEDURE — 99213 OFFICE O/P EST LOW 20 MIN: CPT

## 2024-01-19 PROBLEM — N94.6 DYSMENORRHEA: Status: ACTIVE | Noted: 2023-12-21

## 2024-01-19 PROBLEM — R10.2 PELVIC PAIN IN FEMALE: Status: ACTIVE | Noted: 2023-12-21

## 2024-01-19 PROBLEM — N92.0 HEAVY MENSTRUAL BLEEDING: Status: ACTIVE | Noted: 2023-12-21

## 2024-01-19 PROBLEM — D25.9 LEIOMYOMA UTERI: Status: ACTIVE | Noted: 2023-12-21

## 2024-01-19 NOTE — REVIEW OF SYSTEMS
"Subjective:      Patient ID: Bonny Leblanc is a 51 y.o. female.    Vitals:  height is 5' 3" (1.6 m) and weight is 54 kg (119 lb). Her temperature is 98.5 °F (36.9 °C). Her blood pressure is 124/72 and her pulse is 71. Her respiration is 18 and oxygen saturation is 98%.     Chief Complaint: Sinus Problem    Pt came in today with the complaints of headaches, vomiting, congestion and nausea that started a week ago. She stated that she was here 3 weeks ago for the same symptoms and once she finished the medications it came back. She has been taking OTC medications for her symptoms this time around      Sinus Problem  This is a new problem. The current episode started in the past 7 days. The problem has been gradually worsening since onset. There has been no fever. Her pain is at a severity of 5/10. The pain is mild. Associated symptoms include congestion, headaches, sinus pressure and sneezing. Pertinent negatives include no chills, coughing, diaphoresis, ear pain, hoarse voice, neck pain, shortness of breath, sore throat or swollen glands. Past treatments include oral decongestants. The treatment provided no relief.     Constitution: Negative for chills and sweating.   HENT:  Positive for congestion and sinus pressure. Negative for ear pain and sore throat.    Neck: Negative for neck pain.   Respiratory:  Negative for cough and shortness of breath.    Allergic/Immunologic: Positive for sneezing.   Neurological:  Positive for headaches.    Objective:     Physical Exam   Constitutional: She is oriented to person, place, and time. She appears well-developed. She is cooperative.  Non-toxic appearance. She does not appear ill. No distress.   HENT:   Head: Normocephalic and atraumatic.   Ears:   Right Ear: Hearing, tympanic membrane, external ear and ear canal normal.   Left Ear: Hearing, tympanic membrane, external ear and ear canal normal.   Nose: Rhinorrhea and congestion present. No mucosal edema or nasal " deformity. No epistaxis. Right sinus exhibits no maxillary sinus tenderness and no frontal sinus tenderness. Left sinus exhibits no maxillary sinus tenderness and no frontal sinus tenderness.   Mouth/Throat: Uvula is midline, oropharynx is clear and moist and mucous membranes are normal. No trismus in the jaw. Normal dentition. No uvula swelling. No oropharyngeal exudate, posterior oropharyngeal edema or posterior oropharyngeal erythema.   Eyes: Conjunctivae and lids are normal. No scleral icterus.   Neck: Trachea normal and phonation normal. Neck supple. No edema present. No erythema present. No neck rigidity present.   Cardiovascular: Normal rate, regular rhythm, normal heart sounds and normal pulses.   Pulmonary/Chest: Effort normal and breath sounds normal. No respiratory distress. She has no decreased breath sounds. She has no rhonchi.   Abdominal: Normal appearance.   Musculoskeletal: Normal range of motion.         General: No deformity. Normal range of motion.   Neurological: She is alert and oriented to person, place, and time. She exhibits normal muscle tone. Coordination normal.   Skin: Skin is warm, dry, intact, not diaphoretic and not pale.   Psychiatric: Her speech is normal and behavior is normal. Judgment and thought content normal.   Nursing note and vitals reviewed.    Assessment:     1. Chronic maxillary sinusitis        Plan:       Chronic maxillary sinusitis  -     azithromycin (Z-GIOVANNA) 250 MG tablet; Take 2 tablets by mouth on day 1; Take 1 tablet by mouth on days 2-5  Dispense: 6 tablet; Refill: 0  -     promethazine-dextromethorphan (PROMETHAZINE-DM) 6.25-15 mg/5 mL Syrp; Take 5 mLs by mouth 3 (three) times daily. for 7 days  Dispense: 105 mL; Refill: 0      Patient Instructions   If your condition worsens we recommend that you receive another evaluation at the emergency room immediately or contact your primary medical clinics after hours call service to discuss your concerns. You must  understand that you've received an Urgent Care treatment only and that you may be released before all of your medical problems are known or treated. You, the patient, will arrange for follow up care as instructed.  Drink plenty of Fluids  Wash hands frequently using mild antibacterial soap lathering for at least 15 seconds then rinse  Get plenty of Rest  Salt water gargles  Follow up in 1-2 weeks with Primary Care physician if not significantly better.   If you are not allergic please Tylenol every 4-6 hours as needed and/or Ibuprofen every 6-8 hours as needed, over the counter for pain or fever.  Take OTC Cough/Congestion medicine as needed. Talk to your pharmacist about the best option for you.                    [Night Sweats] : night sweats [Abdominal Pain] : abdominal pain [Breast Lump] : breast lump [Back Pain] : back pain [Negative] : Heme/Lymph

## 2024-01-19 NOTE — PHYSICAL EXAM
[Chaperone Present] : A chaperone was present in the examining room during all aspects of the physical examination [Appropriately responsive] : appropriately responsive [Alert] : alert [Regular Rate Rhythm] : regular rate rhythm [No Murmurs] : no murmurs [Clear to Auscultation B/L] : clear to auscultation bilaterally

## 2024-01-19 NOTE — HISTORY OF PRESENT ILLNESS
[Patient reported mammogram was normal] : Patient reported mammogram was normal [Patient reported PAP Smear was normal] : Patient reported PAP Smear was normal [Frequency: Q ___ days] : menstrual periods occur approximately every [unfilled] days [Menarche Age: ____] : age at menarche was [unfilled] [Currently Active] : currently active [Men] : men [Mammogramdate] : 03/23 [TextBox_19] : BIRAD 1 [PapSmeardate] : 09/22 [TextBox_31] : denies ever having abnormal [FreeTextEntry1] : 11/26/23

## 2024-01-19 NOTE — PLAN
[FreeTextEntry1] : Her son was present and helped tranlsate for some portions EMB benign reviewed, labs reviewed Rev medical management, UAE option and hysterectomy option and r/b of all tx prior visit - pt desires to proceed with definitive surgical management  Counseled on MIS approach to hysterectomy - will schedule robotic assisted total lap hysterectomy, b/l salpingectomy, cystoscopy  We discussed r/b/a of surgery including bleeding, infection, damage to internal visceral organs such as bowel, bladder, nerves, vessels, and ureters, fistulas, thrombotic events, stroke, MI, death. Possible need for laparotomy also was reviewed. We reviewed operative risks being < 5%. Discussed possible need for uterine morcellation in a bag (contained) if it is unable to be removed in one piece.  She is amenable to blood products Post op course was reviewed We discussed recovery All questions were answered She will undergo ERAS protocol with poss same day discharge if she qualifies Does not need clearance, will obtain preop EKG   Can use OTC analgesics for pain, rev with patient  Patient was also given colonoscopy referral to complete but not necessary to do prior to her surgery with me

## 2024-01-22 RX ORDER — PEG-3350, SODIUM SULFATE, SODIUM CHLORIDE, POTASSIUM CHLORIDE, SODIUM ASCORBATE AND ASCORBIC ACID 7.5-2.691G
100 KIT ORAL
Qty: 1 | Refills: 0 | Status: ACTIVE | COMMUNITY
Start: 2024-01-22 | End: 1900-01-01

## 2024-01-29 ENCOUNTER — OUTPATIENT (OUTPATIENT)
Dept: OUTPATIENT SERVICES | Facility: HOSPITAL | Age: 49
LOS: 1 days | End: 2024-01-29

## 2024-01-29 VITALS
OXYGEN SATURATION: 98 % | TEMPERATURE: 97 F | SYSTOLIC BLOOD PRESSURE: 126 MMHG | HEIGHT: 65 IN | WEIGHT: 173.94 LBS | RESPIRATION RATE: 16 BRPM | DIASTOLIC BLOOD PRESSURE: 81 MMHG | HEART RATE: 75 BPM

## 2024-01-29 DIAGNOSIS — R06.83 SNORING: ICD-10-CM

## 2024-01-29 DIAGNOSIS — E03.9 HYPOTHYROIDISM, UNSPECIFIED: ICD-10-CM

## 2024-01-29 DIAGNOSIS — Z98.890 OTHER SPECIFIED POSTPROCEDURAL STATES: Chronic | ICD-10-CM

## 2024-01-29 DIAGNOSIS — N92.1 EXCESSIVE AND FREQUENT MENSTRUATION WITH IRREGULAR CYCLE: ICD-10-CM

## 2024-01-29 DIAGNOSIS — D25.9 LEIOMYOMA OF UTERUS, UNSPECIFIED: ICD-10-CM

## 2024-01-29 DIAGNOSIS — Z98.891 HISTORY OF UTERINE SCAR FROM PREVIOUS SURGERY: Chronic | ICD-10-CM

## 2024-01-29 LAB
A1C WITH ESTIMATED AVERAGE GLUCOSE RESULT: 5.9 % — HIGH (ref 4–5.6)
ANION GAP SERPL CALC-SCNC: 10 MMOL/L — SIGNIFICANT CHANGE UP (ref 7–14)
APPEARANCE UR: CLEAR — SIGNIFICANT CHANGE UP
BACTERIA # UR AUTO: NEGATIVE /HPF — SIGNIFICANT CHANGE UP
BILIRUB UR-MCNC: NEGATIVE — SIGNIFICANT CHANGE UP
BLD GP AB SCN SERPL QL: NEGATIVE — SIGNIFICANT CHANGE UP
BUN SERPL-MCNC: 15 MG/DL — SIGNIFICANT CHANGE UP (ref 7–23)
CALCIUM SERPL-MCNC: 9.2 MG/DL — SIGNIFICANT CHANGE UP (ref 8.4–10.5)
CAST: 0 /LPF — SIGNIFICANT CHANGE UP (ref 0–4)
CHLORIDE SERPL-SCNC: 103 MMOL/L — SIGNIFICANT CHANGE UP (ref 98–107)
CO2 SERPL-SCNC: 26 MMOL/L — SIGNIFICANT CHANGE UP (ref 22–31)
COLOR SPEC: YELLOW — SIGNIFICANT CHANGE UP
CREAT SERPL-MCNC: 0.69 MG/DL — SIGNIFICANT CHANGE UP (ref 0.5–1.3)
DIFF PNL FLD: NEGATIVE — SIGNIFICANT CHANGE UP
EGFR: 106 ML/MIN/1.73M2 — SIGNIFICANT CHANGE UP
ESTIMATED AVERAGE GLUCOSE: 123 — SIGNIFICANT CHANGE UP
GLUCOSE SERPL-MCNC: 89 MG/DL — SIGNIFICANT CHANGE UP (ref 70–99)
GLUCOSE UR QL: NEGATIVE MG/DL — SIGNIFICANT CHANGE UP
HCG SERPL-ACNC: <1 MIU/ML — SIGNIFICANT CHANGE UP
KETONES UR-MCNC: NEGATIVE MG/DL — SIGNIFICANT CHANGE UP
LEUKOCYTE ESTERASE UR-ACNC: ABNORMAL
NITRITE UR-MCNC: NEGATIVE — SIGNIFICANT CHANGE UP
PH UR: 6 — SIGNIFICANT CHANGE UP (ref 5–8)
POTASSIUM SERPL-MCNC: 4.7 MMOL/L — SIGNIFICANT CHANGE UP (ref 3.5–5.3)
POTASSIUM SERPL-SCNC: 4.7 MMOL/L — SIGNIFICANT CHANGE UP (ref 3.5–5.3)
PROT UR-MCNC: NEGATIVE MG/DL — SIGNIFICANT CHANGE UP
RBC CASTS # UR COMP ASSIST: 0 /HPF — SIGNIFICANT CHANGE UP (ref 0–4)
RH IG SCN BLD-IMP: POSITIVE — SIGNIFICANT CHANGE UP
RH IG SCN BLD-IMP: POSITIVE — SIGNIFICANT CHANGE UP
SODIUM SERPL-SCNC: 139 MMOL/L — SIGNIFICANT CHANGE UP (ref 135–145)
SP GR SPEC: 1.01 — SIGNIFICANT CHANGE UP (ref 1–1.03)
SQUAMOUS # UR AUTO: 3 /HPF — SIGNIFICANT CHANGE UP (ref 0–5)
UROBILINOGEN FLD QL: 0.2 MG/DL — SIGNIFICANT CHANGE UP (ref 0.2–1)
WBC UR QL: 2 /HPF — SIGNIFICANT CHANGE UP (ref 0–5)

## 2024-01-29 RX ORDER — SODIUM CHLORIDE 9 MG/ML
1000 INJECTION, SOLUTION INTRAVENOUS
Refills: 0 | Status: DISCONTINUED | OUTPATIENT
Start: 2024-02-01 | End: 2024-02-15

## 2024-01-29 RX ORDER — SODIUM CHLORIDE 9 MG/ML
3 INJECTION INTRAMUSCULAR; INTRAVENOUS; SUBCUTANEOUS EVERY 8 HOURS
Refills: 0 | Status: DISCONTINUED | OUTPATIENT
Start: 2024-02-01 | End: 2024-02-15

## 2024-01-29 NOTE — H&P PST ADULT - HISTORY OF PRESENT ILLNESS
Patient is a 49 yo  c/o pelvic pain and AUB x approximately 1 year with a known fibroid uterus. She notes the pain is lower abdomen and she feels it radiating occasionally to her back. She describes it as a "pulling sensation" as if something is stuck. She uses occasional NSAID's and Tylenol for this. She also was endorsing AUB and notes cycles are always heavy and painful. She notes cycles became longer a year ago (from 5 days to 6-7) and heavier with now passage of clots. She did not know she had fibroids until last year. She underwent pelvic u/s that showed multifibroid uterus. Abd exam was non tender on evaluation. She also underwent CT abd pelvis due to right sided abd pain noting 6 cm subserosal fibroid. Pain has intensify and now opt for surgery Patient is a 47 yo  c/o pelvic pain and AUB x approximately 1 year with a known fibroid uterus. She notes the pain is lower abdomen and she feels it radiating occasionally to her back. She describes it as a "pulling sensation" as if something is stuck. She uses occasional NSAID's and Tylenol for this. She c/o dysmenorrhea and menorhagia. Pt recieved pelvic u/s that showed multi-fibroid uterus. Abd exam was non tender on  CT abd pelvis due to right sided abd pain noting 6 cm subserosal fibroid. Pain has intensify. pt was evaluated by surgeon and now present for preop evaluation for laparoscopic hysterectomy bilateral salpingectomy cystoscopy

## 2024-01-29 NOTE — H&P PST ADULT - ATTENDING COMMENTS
Plan for TLH, BS, Cystoscopy. Above rev with  and patient. All questions answered. She was marked by me.

## 2024-01-29 NOTE — H&P PST ADULT - PROBLEM SELECTOR PLAN 1
scheduled laparoscopic hysterectomy bilateral salpingectomy, cystoscopy.  Pre op and Hibiclens instructions given and explained.  Avoid NSAIDs and OTC supplements.   Patient verbalized understanding

## 2024-01-29 NOTE — H&P PST ADULT - HEIGHT IN CM
165.1 Mohs Histo Method Verbiage: Each section was then color coded, mapped and processed in the Mohs lab using the Mohs protocol and submitted for frozen section. Please see Mohs map for histologic findings encountered.

## 2024-01-29 NOTE — H&P PST ADULT - NSICDXPASTSURGICALHX_GEN_ALL_CORE_FT
--------------  ADMISSION REVIEW     Payor: Isai GRANT/JULIUS  Subscriber #:  [id number]  Authorization Number: [id number]    Admit date: 6/17/18  Admit time: [address]       Admitting Physician: Ammon Looney MD  Attending Physician:  Ammon Looney MD  Primary Ca Options of FTS, CVS, amnio,            genetic counseling, Level 1 vs level 2 u/s            reviewed.                  If 32-39 y/o by [facility],            then [facility] u/s at 32 wks & weekly NST             at 39 docusate sodium 100 MG Oral Cap Take 100 mg by mouth 2 (two) times daily. Disp:  Rfl:  6/15/2018 at Unknown time   Misc.  Devices (BREAST PUMP) Does not apply Misc Double Electric Breast Pump  Disp: 1 each Rfl: 0 Taking       Allergies: No Known Allerg Date Action Dose Route User    6/18/2018 1123 Given 100 mg Oral Ami, Carmen HESS, RN      fentaNYL 2mcg/ml & bupivacaine 1.25mg/ml epidural infusion     Date Action Dose Route User    6/17/2018 1507 New Bag 10 mL/hr Epidural Slade Roberto, DO [place] Date of Delivery: 2018   Time of Delivery: 7:58 PM  Delivery Type: Normal spontaneous vaginal delivery     Infant Sex     Information for the patient's : Patel Area Girl  [[id number]]      female     Infant Birthweight     Information for the patie . PAST SURGICAL HISTORY:  H/O  section     S/P left breast biopsy

## 2024-01-30 LAB
CULTURE RESULTS: SIGNIFICANT CHANGE UP
SPECIMEN SOURCE: SIGNIFICANT CHANGE UP

## 2024-01-31 ENCOUNTER — TRANSCRIPTION ENCOUNTER (OUTPATIENT)
Age: 49
End: 2024-01-31

## 2024-01-31 NOTE — ASU PATIENT PROFILE, ADULT - FALL HARM RISK - UNIVERSAL INTERVENTIONS
Bed in lowest position, wheels locked, appropriate side rails in place/Call bell, personal items and telephone in reach/Instruct patient to call for assistance before getting out of bed or chair/Non-slip footwear when patient is out of bed/[place] to call system/Physically safe environment - no spills, clutter or unnecessary equipment/Purposeful Proactive Rounding/Room/bathroom lighting operational, light cord in reach

## 2024-02-01 ENCOUNTER — RESULT REVIEW (OUTPATIENT)
Age: 49
End: 2024-02-01

## 2024-02-01 ENCOUNTER — OUTPATIENT (OUTPATIENT)
Dept: OUTPATIENT SERVICES | Facility: HOSPITAL | Age: 49
LOS: 1 days | Discharge: HOME | End: 2024-02-01
Payer: MEDICAID

## 2024-02-01 ENCOUNTER — TRANSCRIPTION ENCOUNTER (OUTPATIENT)
Age: 49
End: 2024-02-01

## 2024-02-01 ENCOUNTER — APPOINTMENT (OUTPATIENT)
Dept: OBGYN | Facility: CLINIC | Age: 49
End: 2024-02-01

## 2024-02-01 VITALS
DIASTOLIC BLOOD PRESSURE: 61 MMHG | HEART RATE: 77 BPM | RESPIRATION RATE: 16 BRPM | SYSTOLIC BLOOD PRESSURE: 123 MMHG | OXYGEN SATURATION: 97 % | TEMPERATURE: 98 F

## 2024-02-01 VITALS
HEART RATE: 82 BPM | RESPIRATION RATE: 16 BRPM | SYSTOLIC BLOOD PRESSURE: 116 MMHG | WEIGHT: 173.94 LBS | TEMPERATURE: 98 F | HEIGHT: 65 IN | DIASTOLIC BLOOD PRESSURE: 54 MMHG | OXYGEN SATURATION: 100 %

## 2024-02-01 DIAGNOSIS — N92.1 EXCESSIVE AND FREQUENT MENSTRUATION WITH IRREGULAR CYCLE: ICD-10-CM

## 2024-02-01 DIAGNOSIS — Z98.891 HISTORY OF UTERINE SCAR FROM PREVIOUS SURGERY: Chronic | ICD-10-CM

## 2024-02-01 DIAGNOSIS — Z98.890 OTHER SPECIFIED POSTPROCEDURAL STATES: Chronic | ICD-10-CM

## 2024-02-01 LAB
GLUCOSE BLDC GLUCOMTR-MCNC: 102 MG/DL — HIGH (ref 70–99)
HCG UR QL: NEGATIVE — SIGNIFICANT CHANGE UP

## 2024-02-01 PROCEDURE — 88307 TISSUE EXAM BY PATHOLOGIST: CPT | Mod: 26

## 2024-02-01 PROCEDURE — 52000 CYSTOURETHROSCOPY: CPT | Mod: 59

## 2024-02-01 PROCEDURE — 58573 TLH W/T/O UTERUS OVER 250 G: CPT

## 2024-02-01 RX ORDER — OXYCODONE HYDROCHLORIDE 5 MG/1
1 TABLET ORAL
Qty: 10 | Refills: 0
Start: 2024-02-01

## 2024-02-01 RX ORDER — PHENAZOPYRIDINE HCL 100 MG
100 TABLET ORAL ONCE
Refills: 0 | Status: COMPLETED | OUTPATIENT
Start: 2024-02-01 | End: 2024-02-01

## 2024-02-01 RX ORDER — ACETAMINOPHEN 500 MG
3 TABLET ORAL
Qty: 0 | Refills: 0 | DISCHARGE

## 2024-02-01 RX ORDER — ACETAMINOPHEN 500 MG
1000 TABLET ORAL ONCE
Refills: 0 | Status: COMPLETED | OUTPATIENT
Start: 2024-02-01 | End: 2024-02-01

## 2024-02-01 RX ORDER — CHLORHEXIDINE GLUCONATE 213 G/1000ML
1 SOLUTION TOPICAL ONCE
Refills: 0 | Status: COMPLETED | OUTPATIENT
Start: 2024-02-01 | End: 2024-02-01

## 2024-02-01 RX ORDER — SODIUM CHLORIDE 9 MG/ML
1000 INJECTION, SOLUTION INTRAVENOUS
Refills: 0 | Status: DISCONTINUED | OUTPATIENT
Start: 2024-02-01 | End: 2024-02-15

## 2024-02-01 RX ORDER — HYDROMORPHONE HYDROCHLORIDE 2 MG/ML
0.5 INJECTION INTRAMUSCULAR; INTRAVENOUS; SUBCUTANEOUS
Refills: 0 | Status: DISCONTINUED | OUTPATIENT
Start: 2024-02-01 | End: 2024-02-01

## 2024-02-01 RX ORDER — LEVOTHYROXINE SODIUM 125 MCG
1 TABLET ORAL
Refills: 0 | DISCHARGE

## 2024-02-01 RX ORDER — CELECOXIB 200 MG/1
400 CAPSULE ORAL ONCE
Refills: 0 | Status: COMPLETED | OUTPATIENT
Start: 2024-02-01 | End: 2024-02-01

## 2024-02-01 RX ORDER — TOBRAMYCIN 0.3 %
2 DROPS OPHTHALMIC (EYE) EVERY 4 HOURS
Refills: 0 | Status: DISCONTINUED | OUTPATIENT
Start: 2024-02-01 | End: 2024-02-15

## 2024-02-01 RX ORDER — IBUPROFEN 200 MG
1 TABLET ORAL
Qty: 0 | Refills: 0 | DISCHARGE

## 2024-02-01 RX ORDER — SODIUM CHLORIDE 9 MG/ML
500 INJECTION, SOLUTION INTRAVENOUS ONCE
Refills: 0 | Status: DISCONTINUED | OUTPATIENT
Start: 2024-02-01 | End: 2024-02-15

## 2024-02-01 RX ORDER — GABAPENTIN 400 MG/1
600 CAPSULE ORAL ONCE
Refills: 0 | Status: COMPLETED | OUTPATIENT
Start: 2024-02-01 | End: 2024-02-01

## 2024-02-01 RX ORDER — ONDANSETRON 8 MG/1
4 TABLET, FILM COATED ORAL ONCE
Refills: 0 | Status: DISCONTINUED | OUTPATIENT
Start: 2024-02-01 | End: 2024-02-15

## 2024-02-01 RX ORDER — OXYCODONE HYDROCHLORIDE 5 MG/1
5 TABLET ORAL ONCE
Refills: 0 | Status: DISCONTINUED | OUTPATIENT
Start: 2024-02-01 | End: 2024-02-01

## 2024-02-01 RX ORDER — OXYCODONE HYDROCHLORIDE 5 MG/1
1 TABLET ORAL
Refills: 0
Start: 2024-02-01

## 2024-02-01 RX ADMIN — CHLORHEXIDINE GLUCONATE 1 APPLICATION(S): 213 SOLUTION TOPICAL at 06:53

## 2024-02-01 RX ADMIN — Medication 2 DROP(S): at 15:10

## 2024-02-01 RX ADMIN — SODIUM CHLORIDE 125 MILLILITER(S): 9 INJECTION, SOLUTION INTRAVENOUS at 13:10

## 2024-02-01 RX ADMIN — GABAPENTIN 600 MILLIGRAM(S): 400 CAPSULE ORAL at 07:46

## 2024-02-01 RX ADMIN — OXYCODONE HYDROCHLORIDE 5 MILLIGRAM(S): 5 TABLET ORAL at 15:55

## 2024-02-01 RX ADMIN — OXYCODONE HYDROCHLORIDE 5 MILLIGRAM(S): 5 TABLET ORAL at 15:15

## 2024-02-01 RX ADMIN — SODIUM CHLORIDE 3 MILLILITER(S): 9 INJECTION INTRAMUSCULAR; INTRAVENOUS; SUBCUTANEOUS at 15:23

## 2024-02-01 RX ADMIN — CELECOXIB 400 MILLIGRAM(S): 200 CAPSULE ORAL at 07:46

## 2024-02-01 RX ADMIN — Medication 1 DROP(S): at 15:10

## 2024-02-01 NOTE — ASU DISCHARGE PLAN (ADULT/PEDIATRIC) - PAIN MANAGEMENT
Tylenol 975mg PO Q6H and Motrin 600mg Q6H PRN for pain. Encouraged pt to take pain medication around the clock for the next few days. Please take Oxycodone for severe pain/Take over the counter pain medication/Prescriptions electronically submitted to pharmacy from [facility]

## 2024-02-01 NOTE — ASU DISCHARGE PLAN (ADULT/PEDIATRIC) - CARE PROVIDER_API CALL
Joaquina Cummings  Obstetrics and Gynecology  [address], [address]  [place] [address]  Phone: [phone]  Fax: [phone]  Established Patient  Follow Up Time: 2 weeks

## 2024-02-01 NOTE — ASU DISCHARGE PLAN (ADULT/PEDIATRIC) - CALL YOUR DOCTOR IF YOU HAVE ANY OF THE FOLLOWING:
Bleeding that does not stop/Pain not relieved by Medications/Fever greater than (need to indicate Fahrenheit or Celsius)/Nausea and vomiting that does not stop Bleeding that does not stop/Swelling that gets worse/Pain not relieved by Medications/Fever greater than (need to indicate Fahrenheit or Celsius)/Wound/Surgical Site with redness, or foul smelling discharge or pus/Nausea and vomiting that does not stop/Unable to urinate/Inability to tolerate liquids or foods/Increased irritability or sluggishness

## 2024-02-01 NOTE — ASU DISCHARGE PLAN (ADULT/PEDIATRIC) - NURSING INSTRUCTIONS
You received IV Tylenol for pain management at _1030__. Please DO NOT take any Tylenol (Acetaminophen) containing products, such as Vicodin, Percocet, Excedrin, and cold medications for the next 6 hours (until _430__ PM). DO NOT TAKE MORE THAN 3000 MG OF TYLENOL in a 24 hour period.   You received IV Toradol for pain management at __1215_. Please DO NOT take Motrin/Ibuprofen/Advil/Aleve/NSAIDs (Non-Steroidal Anti-Inflammatory Drugs) for the next 6 hours (until _615__ PM). You received IV Tylenol for pain management at _1030__. Please DO NOT take any Tylenol (Acetaminophen) containing products, such as Vicodin, Percocet, Excedrin, and cold medications for the next 6 hours (until _430__ PM). DO NOT TAKE MORE THAN 3000 MG OF TYLENOL in a 24 hour period.   You received IV Toradol for pain management at __1215_. Please DO NOT take Motrin/Ibuprofen/Advil/Aleve/NSAIDs (Non-Steroidal Anti-Inflammatory Drugs) for the next 6 hours (until _615__ PM).  Next does of Tobramycin eye drop to left eye  due at 715pm and then last does due at 1115pm

## 2024-02-01 NOTE — CHART NOTE - NSCHARTNOTEFT_GEN_A_CORE
R4 Chart Note    Patient seen and examined at bedside, recently post-op. No acute complaints at this time. Denies CP, SOB, N/V, fevers, and chills.    Vital Signs Last 24 Hours  T(C): 36.9 (02-01-24 @ 12:35), Max: 36.9 (02-01-24 @ 12:35)  HR: 72 (02-01-24 @ 14:30) (67 - 82)  BP: 129/79 (02-01-24 @ 14:30) (106/60 - 129/79)  RR: 17 (02-01-24 @ 14:30) (11 - 18)  SpO2: 97% (02-01-24 @ 14:30) (95% - 100%)    I&O's Summary    01 Feb 2024 07:01  -  01 Feb 2024 14:36  --------------------------------------------------------  IN: 125 mL / OUT: 0 mL / NET: 125 mL        Physical Exam:  General: NAD  CV: NR, RR, S1, S2, no M/R/G  Lungs: CTA-B  Abdomen: Soft, appropriately tender, non-distended, +BS  Incision: 4 lsc port sites CDI  Ext: No pain or swelling    Labs:      MEDICATIONS  (STANDING):  lactated ringers. 1000 milliLiter(s) (125 mL/Hr) IV Continuous <Continuous>  lactated ringers. 1000 milliLiter(s) (30 mL/Hr) IV Continuous <Continuous>  sodium chloride 0.9% lock flush 3 milliLiter(s) IV Push every 8 hours    MEDICATIONS  (PRN):  HYDROmorphone  Injectable 0.5 milliGRAM(s) IV Push every 10 minutes PRN Moderate Pain (4 - 6)  ondansetron Injectable 4 milliGRAM(s) IV Push once PRN Nausea and/or Vomiting  oxyCODONE    IR 5 milliGRAM(s) Oral once PRN Moderate Pain (4 - 6)      47 yo s/pTLH, BS, Cysto recovering well in acute post-operative state.    Neuro: Pain controlled. PO pain meds and IV pain meds. Patient very sleepy at time of evaluation  CV: Hemodynamically stable  Pulm: Encourage oob/ambulation, incentive spirometer at bedside  GI: Regular Diet   : DTV  Heme: HSQ and SCDs for DVT PPX  ID: afebrile  Endo: no active issues  Dispo: continue routine post-op care         HELADIO Hirsch, PGY-4  d/w GYN Team

## 2024-02-01 NOTE — BRIEF OPERATIVE NOTE - OPERATION/FINDINGS
Exam under anesthesia:   - normal external genitalia  - 12 week size anteverted uterus, no uterine or adnexal masses appreciated   - vagina and cervix appeared normal  -Uterus sounded to 12 cm     Intraabdominal findings:  - grossly normal ovaries and fallopian tubes bilaterally. Left ovary noted to have a small 1 cm follicular cyst   - uterus with 6 cm fundal SS/IM fibroid and multiple SS fibroids on the right anterior lateral aspect of the uterus    -Dense vesiculouterine adhesions likely form patients previous C/S   - normal upper abdominal survey with smooth liver edge, however adhesions noted by the liver   - appendix not visualized  - normal omentum and bowel    Cystoscopy:  - normal bladder mucosa and no suture, mesh, injury, or foreign body noted  - bilateral ureteral orifices were identified and effluxing clear yellow urine

## 2024-02-01 NOTE — BRIEF OPERATIVE NOTE - NSICDXBRIEFPOSTOP_GEN_ALL_CORE_FT
POST-OP DIAGNOSIS:  Abnormal uterine bleeding (AUB) 01-Feb-2024 18:25:42  Regina Hirsch  Pelvic pain 01-Feb-2024 18:25:33  Regina Hirsch  Fibroids 01-Feb-2024 18:25:26  Regina Hirsch

## 2024-02-01 NOTE — BRIEF OPERATIVE NOTE - NSICDXBRIEFPREOP_GEN_ALL_CORE_FT
PRE-OP DIAGNOSIS:  Fibroids 01-Feb-2024 18:25:19  Regina Hirsch  Pelvic pain 01-Feb-2024 18:25:09  Regina Hirsch  Abnormal uterine bleeding (AUB) 01-Feb-2024 18:25:02  Regina Hirsch

## 2024-02-01 NOTE — ASU DISCHARGE PLAN (ADULT/PEDIATRIC) - ASU DC SPECIAL INSTRUCTIONSFT
Return to your regular way of eating.  Resume normal activity as tolerated, but no heavy lifting or strenuous activity for 6 weeks.  No driving for next 6 weeks and/or while on narcotic pain medication.  Complete vaginal rest, no tampons, no douching, no tub bathing, no sexual activities for 8 weeks unless otherwise instructed by your doctor.  Call your doctor with any signs and symptoms of infection such as fever, chills, nausea or vomiting.  Call your doctor with redness or swelling at the incision site, fluid leakage or wound separation.  Call your doctor if you're unable to tolerate food or have difficulty urinating.  Call your doctor if you have pain that is not relieved by your prescribed medications.  Notify your doctor with any other concerns.  Follow up with Dr. Cummings in 2 weeks

## 2024-02-01 NOTE — CHART NOTE - NSCHARTNOTEFT_GEN_A_CORE
Pt c/o discomfort to her Left eye while in PACU  Endorses feeling of "jose grit" in Lt eye. . Denies contact lens use, diplopia, blurry vision,  or photophobia. Eye irrigated with normal saline without alleviation of symptoms.      PE:  PERRLA, EOMI, sclera white, conjunctiva injected, no foreign object seen. + tearing noted  in the R  eye     A/P: Lt  Corneal abrasion    -- Tetracaine 0.5%  opthalmic solution 2 gtt x 1 to affected eye with alleviation of symptoms  ---Tobramycin Opht. solution 0.3% one drop to the affected eye every four hours x3  -- Cold compresses to eye prn for comfort  ---F/U with Ophthalmology if symptoms persist >24 hours

## 2024-02-01 NOTE — BRIEF OPERATIVE NOTE - NSICDXBRIEFPROCEDURE_GEN_ALL_CORE_FT
PROCEDURES:  Laparoscopic total hysterectomy with cystoscopy 01-Feb-2024 18:24:27  Regina Hirsch  Salpingectomy, bilateral 01-Feb-2024 18:24:37  Regina Hirsch

## 2024-02-05 ENCOUNTER — NON-APPOINTMENT (OUTPATIENT)
Age: 49
End: 2024-02-05

## 2024-02-08 ENCOUNTER — APPOINTMENT (OUTPATIENT)
Dept: OBGYN | Facility: CLINIC | Age: 49
End: 2024-02-08
Payer: MEDICAID

## 2024-02-08 VITALS
BODY MASS INDEX: 29.66 KG/M2 | HEIGHT: 65 IN | WEIGHT: 178 LBS | DIASTOLIC BLOOD PRESSURE: 70 MMHG | SYSTOLIC BLOOD PRESSURE: 118 MMHG

## 2024-02-08 DIAGNOSIS — H57.13 OCULAR PAIN, BILATERAL: ICD-10-CM

## 2024-02-08 DIAGNOSIS — S05.02XA INJURY OF CONJUNCTIVA AND CORNEAL ABRASION W/OUT FOREIGN BODY, LEFT EYE, INITIAL ENCOUNTER: ICD-10-CM

## 2024-02-08 DIAGNOSIS — H57.10 OCULAR PAIN, UNSPECIFIED EYE: ICD-10-CM

## 2024-02-08 PROCEDURE — 99213 OFFICE O/P EST LOW 20 MIN: CPT | Mod: 24

## 2024-02-08 PROCEDURE — 99024 POSTOP FOLLOW-UP VISIT: CPT

## 2024-02-08 NOTE — REASON FOR VISIT
[Post Op Day: ___] : Post-Op Day:  #[unfilled] [Procedure: ___] : Procedure: [unfilled] [Indication: ___] : Indication: [unfilled] [Family Member] : family member

## 2024-02-08 NOTE — PLAN
[de-identified] : Steri strips removed, incisions cleaned [FreeTextEntry1] : Optho referral to eval eye discomfort, corneal abrasion still ongoing? Pt with both eye drops to bring to optho appt - will work to get her urgent appt for eval given post op issue  Continue pelvic rest, avoid strenuous activity  RTO 4 weeks for second post op appt  Reassured that healing/progressing, danger signs reviewed including heavy bleeding, pain, foul odor, discharge, incisional dehiscense  Pt with  and son, all questions answered  Encouraged more regular Miralax use, PO hydration, cont OTC analgesics as needed

## 2024-02-08 NOTE — HISTORY OF PRESENT ILLNESS
[Pain is well-controlled] : pain is well-controlled [Clean/Dry/Intact] : clean, dry and intact [Fever] : no fever [Chills] : no chills [Nausea] : no nausea [Vomiting] : no vomiting [Diarrhea] : no diarrhea [Vaginal Bleeding] : no vaginal bleeding [Pelvic Pressure] : no pelvic pressure [Dysuria] : no dysuria [Vaginal Discharge] : no vaginal discharge [Constipation] : constipation [Erythema] : not erythematous [Swelling] : not swollen [Dehiscence] : not dehisced [Healed] : not healed [None] : no vaginal bleeding [Normal] : normal [de-identified] : 7 [de-identified] : Pt states she is eating without issue occ nausea in the morning before eating, resolves after. Denies vomiting. Having BM's - 2 total since surgery - added Miralax to her regimen Monday after I spoke to her son. Denies vaginal bleeding. Notes some lower abdominal and R flank soreness. Urinating without issues. Noting some sensation of discomfort in both eyes. Reviewed post op notes and notable for suspected L conreal abrasion - tetracaine and tobramycin was given for short course < 1 day duration at that time after eye irrigated with saline.

## 2024-02-12 LAB — SURGICAL PATHOLOGY STUDY: SIGNIFICANT CHANGE UP

## 2024-03-07 ENCOUNTER — APPOINTMENT (OUTPATIENT)
Dept: OBGYN | Facility: CLINIC | Age: 49
End: 2024-03-07
Payer: MEDICAID

## 2024-03-07 VITALS
DIASTOLIC BLOOD PRESSURE: 70 MMHG | HEIGHT: 65 IN | SYSTOLIC BLOOD PRESSURE: 116 MMHG | BODY MASS INDEX: 29.16 KG/M2 | WEIGHT: 175 LBS

## 2024-03-07 PROCEDURE — 99024 POSTOP FOLLOW-UP VISIT: CPT

## 2024-03-07 PROCEDURE — 99213 OFFICE O/P EST LOW 20 MIN: CPT | Mod: 24

## 2024-03-07 NOTE — REASON FOR VISIT
[Procedure: ___] : Procedure: [unfilled] [Post Op Day: ___] : Post-Op Day:  #[unfilled] [Indication: ___] : Indication: [unfilled] [Family Member] : family member

## 2024-03-07 NOTE — PLAN
[de-identified] : Steri strips removed, incisions cleaned [FreeTextEntry1] : Optho referral to eval b/l eyes given continued tearing and occasional itch likely not related to post op  Continue pelvic rest, avoid strenuous activity x 3 more weeks  Path reviewed and copy given to patient benign results   Resume routine gyn care with annuals with Dr. Anderson, can return to me for any acute post op issues  Reassured that healing/progressing, danger signs reviewed including heavy bleeding, pain, foul odor, discharge, incisional dehiscense at vaginal cuff  Can use replens moisturizer on outside of vagina for itching as needed    Pt with  and son, all questions answered  Encouraged more regular Miralax use, PO hydration, cont OTC analgesics as needed

## 2024-03-07 NOTE — HISTORY OF PRESENT ILLNESS
[Pain is well-controlled] : pain is well-controlled [Constipation] : constipation [Clean/Dry/Intact] : clean, dry and intact [Normal] : normal [Pathology reviewed] : pathology reviewed [Fever] : no fever [Chills] : no chills [Nausea] : no nausea [Vomiting] : no vomiting [Diarrhea] : no diarrhea [Pelvic Pressure] : no pelvic pressure [Vaginal Bleeding] : vaginal bleeding [Dysuria] : no dysuria [Vaginal Discharge] : no vaginal discharge [Erythema] : not erythematous [Swelling] : not swollen [Dehiscence] : not dehisced [Healed] : not healed [Mild] : mild vaginal bleeding [de-identified] : Surg date 2/1/2024 [de-identified] : Pt states she is feeling much better. Only having some occasional discomfort on both flanks with walking/stretching and upper abdominal area. States some occasional vaginal itching on the outside. She is eating without issue. Having regular BM's now without any stool aids. Denies urinary issues. Notes light vaginal spotting intermittently over past 1-2 weeks. Noting no further sensation of discomfort or blurring in her eyes but says she has some excess tearing and some occasional itching of both eyes. She did have a suspected L corneal abrasion post op - tetracaine and tobramycin was given for short course < 1 day duration at that time after eye irrigated with saline.  [TextEntry] : PATH:    1- Uterus, cervix, bilateral fallopian tubes  Final Diagnosis Uterus, cervix, bilateral fallopian tubes; total hysterectomy and bilateral salpingectomy  -  Proliferative endometrium -  Leiomyomata with degenerative changes -  Unremarkable cervix and bilateral fallopian tubes  336 grams [de-identified] : scant tails of sutures removed from 2 port sites, abdomen non distended, non tender anywhere, 4 port sites well healed, speculum exam with scant brownish discharge, no evidence of infection, vaginal cuff well approximated and healing, no granulation tissue seen, bimanual exam with well approximated vaginal cuff, non tender, no defects

## 2024-03-27 ENCOUNTER — APPOINTMENT (OUTPATIENT)
Dept: MAMMOGRAPHY | Facility: IMAGING CENTER | Age: 49
End: 2024-03-27
Payer: MEDICAID

## 2024-03-27 ENCOUNTER — RESULT REVIEW (OUTPATIENT)
Age: 49
End: 2024-03-27

## 2024-03-27 ENCOUNTER — OUTPATIENT (OUTPATIENT)
Dept: OUTPATIENT SERVICES | Facility: HOSPITAL | Age: 49
LOS: 1 days | End: 2024-03-27
Payer: MEDICAID

## 2024-03-27 DIAGNOSIS — Z98.891 HISTORY OF UTERINE SCAR FROM PREVIOUS SURGERY: Chronic | ICD-10-CM

## 2024-03-27 DIAGNOSIS — Z00.8 ENCOUNTER FOR OTHER GENERAL EXAMINATION: ICD-10-CM

## 2024-03-27 DIAGNOSIS — Z98.890 OTHER SPECIFIED POSTPROCEDURAL STATES: Chronic | ICD-10-CM

## 2024-03-27 PROCEDURE — 76641 ULTRASOUND BREAST COMPLETE: CPT

## 2024-03-27 PROCEDURE — 77063 BREAST TOMOSYNTHESIS BI: CPT

## 2024-03-27 PROCEDURE — 77067 SCR MAMMO BI INCL CAD: CPT | Mod: 26

## 2024-03-27 PROCEDURE — 76641 ULTRASOUND BREAST COMPLETE: CPT | Mod: 26,50

## 2024-03-27 PROCEDURE — 77067 SCR MAMMO BI INCL CAD: CPT

## 2024-03-27 PROCEDURE — 77063 BREAST TOMOSYNTHESIS BI: CPT | Mod: 26

## 2024-04-04 ENCOUNTER — APPOINTMENT (OUTPATIENT)
Dept: SURGICAL ONCOLOGY | Facility: CLINIC | Age: 49
End: 2024-04-04
Payer: MEDICAID

## 2024-04-04 VITALS
DIASTOLIC BLOOD PRESSURE: 60 MMHG | HEIGHT: 65 IN | WEIGHT: 175 LBS | OXYGEN SATURATION: 98 % | HEART RATE: 80 BPM | BODY MASS INDEX: 29.16 KG/M2 | SYSTOLIC BLOOD PRESSURE: 114 MMHG

## 2024-04-04 DIAGNOSIS — R92.30 DENSE BREASTS, UNSPECIFIED: ICD-10-CM

## 2024-04-04 DIAGNOSIS — N63.20 UNSPECIFIED LUMP IN THE LEFT BREAST, UNSPECIFIED QUADRANT: ICD-10-CM

## 2024-04-04 PROCEDURE — 99213 OFFICE O/P EST LOW 20 MIN: CPT

## 2024-04-04 NOTE — REASON FOR VISIT
[Follow-Up Visit] : a follow-up visit for [Breast Mass] : breast mass [Fibrocystic Breast] : fibrocystic breast

## 2024-04-04 NOTE — ASSESSMENT
[FreeTextEntry1] : Essence will follow-up in 1 year after her annual breast imaging.   All medical entries were at my, Dr. Vladimir Miller, direction. I have reviewed the chart and agree that the record accurately reflects my personal performance of the history, physical exam, assessment, and plan.  Our office nurse practitioner was present for the duration of the office visit.

## 2024-04-04 NOTE — CONSULT LETTER
[Dear  ___] : Dear  [unfilled], [Consult Letter:] : I had the pleasure of evaluating your patient, [unfilled]. [Please see my note below.] : Please see my note below. [Consult Closing:] : Thank you very much for allowing me to participate in the care of this patient.  If you have any questions, please do not hesitate to contact me. [Sincerely,] : Sincerely, [FreeTextEntry2] : Nelida Bruce MD [FreeTextEntry3] : Vladimir Miller MD\par  Surgical Oncology\par  [facility]\par  Office: [phone]\par  Cell: [phone]\par

## 2024-04-04 NOTE — HISTORY OF PRESENT ILLNESS
[de-identified] : Essence is a pleasant 49 year-old female here for a follow up.   She was previously seen by a breast surgeon in 2013 for evaluation of a left breast mass, ultimately biopsied and found to be consistent with a fibroadenoma.  She completed breast imaging was completed on 11/25/20.  There was no mammographic evidence of malignancy.  Ultrasound revealed a 1.1 cm solid nodule in the left breast at 12:00, 1 cmfn, previously measuring 9 mm. Given interval enlargement, ultrasound-guided biopsy is recommended (BIRADS 4).  Unclear if this corresponds to the previously biopsied nodule. I reviewed her care with a breast radiologist, Dr. Correa, lesion has been stable when compared to 2015 imaging. Advised that there would be no need for tissue sampling and recommended repeat US in 6 months.   She reports having a left mammogram in August 2021 with dense breasts limiting sensitive of exam, no change from prior study dated 11/25/20, no mammographic evidence of malignancy in left breast, BI-RADS 2. Left breast ultrasound revealed, 1.1 cm shadowing solid nodule at left 12:00, US-Guided biopsy suggested, BI-RADS 4.   She underwent an ultrasound-guided biopsy of a left breast mass at 12:00, N+1 on 1/10/22 (this was previously biopsied in 2015 with benign pathology).  Pathology revealed a fibroadenoma (benign and concordant).  She subsequently underwent overdue right breast imaging (mammo and sono) on 3/9/22 with benign findings (BIRADS 2).   Bilateral mammogram and sonogram performed 3/23/23 demonstrated benign findings (BIRADS 2).  Her past medical history includes chronic migraines, cervical spine pain and hypothyroidism.  She denies any family history of breast or ovarian cancer.  She denies alcohol or tobacco use.    B/L mammo/sono 3/2024 with benign findings, BI-RADS 2.  4/4/2024 - Essence returns for ongoing follow-up, she denies palpable breast masses, nipple discharge, skin changes, inversion or breast pain. She recently underwent a total hysterectomy 2/2024 w/ Dr. Joaquina Cummings for fibroids and ultimately benign path. She otherwise denies any new health issues and is feeling well.  Unknown if ever smoked

## 2024-04-04 NOTE — PHYSICAL EXAM
[Normal] : supple, no neck mass and thyroid not enlarged [Normal Supraclavicular Lymph Nodes] : normal supraclavicular lymph nodes [Normal Axillary Lymph Nodes] : normal axillary lymph nodes [Normal] : oriented to person, place and time, with appropriate affect [FreeTextEntry1] : SS present during physical exam.  [de-identified] : Complete breast exam performed in supine and upright positions. No palpable masses, tenderness, nipple discharge, inversion, deviation or enlarged axillary or supraclavicular lymph nodes.

## 2024-07-23 ENCOUNTER — NON-APPOINTMENT (OUTPATIENT)
Age: 49
End: 2024-07-23

## 2024-10-01 ENCOUNTER — APPOINTMENT (OUTPATIENT)
Dept: GASTROENTEROLOGY | Facility: CLINIC | Age: 49
End: 2024-10-01
Payer: COMMERCIAL

## 2024-10-01 VITALS
WEIGHT: 184 LBS | OXYGEN SATURATION: 98 % | SYSTOLIC BLOOD PRESSURE: 125 MMHG | HEART RATE: 78 BPM | DIASTOLIC BLOOD PRESSURE: 84 MMHG | HEIGHT: 65 IN | BODY MASS INDEX: 30.66 KG/M2

## 2024-10-01 DIAGNOSIS — R10.12 LEFT UPPER QUADRANT PAIN: ICD-10-CM

## 2024-10-01 DIAGNOSIS — R10.13 EPIGASTRIC PAIN: ICD-10-CM

## 2024-10-01 PROCEDURE — 99204 OFFICE O/P NEW MOD 45 MIN: CPT

## 2024-10-01 RX ORDER — POLYETHYLENE GLYCOL 3350 AND ELECTROLYTES WITH LEMON FLAVOR 236; 22.74; 6.74; 5.86; 2.97 G/4L; G/4L; G/4L; G/4L; G/4L
236 POWDER, FOR SOLUTION ORAL
Qty: 1 | Refills: 0 | Status: ACTIVE | COMMUNITY
Start: 2024-10-01 | End: 1900-01-01

## 2024-10-01 NOTE — REVIEW OF SYSTEMS
[Feeling Poorly] : not feeling poorly [Feeling Tired] : not feeling tired [Abdominal Pain] : abdominal pain [Constipation] : no constipation [Diarrhea] : no diarrhea [Heartburn] : no heartburn [Bleeding] : no bleeding

## 2024-10-01 NOTE — HISTORY OF PRESENT ILLNESS
[FreeTextEntry1] : 49F here for evaluation of LUQ pain, sometimes lower down. Has been feeling this way since two months after surgery. Pain moves. Feels like sharp pains. Lower abdominal pain - all the time. LUQ pain - feels after standing for long periods of time. Neither is affected by eating. No N/V. DYLAN was done laparoscopically. Normal daily bm without issue. No BRBPR. No prior colonoscopy or endoscopy.   PMHx - hypothyroid PSHx - hysterectomy 2/2024, c-sections All - NKDA Meds - synthroid SHx - no toxic habits. Not working FHx - no Fhx CRC

## 2024-10-01 NOTE — PHYSICAL EXAM
[Alert] : alert [Healthy Appearing] : healthy appearing [No Acute Distress] : no acute distress [Abdomen Soft] : soft [Oriented To Time, Place, And Person] : oriented to person, place, and time [Normal Affect] : the affect was normal [Normal Mood] : the mood was normal [de-identified] : epigastric and LUQ ttp, lower abdominal ttp. LLQ scar ttp

## 2024-10-01 NOTE — ASSESSMENT
[FreeTextEntry1] : Screening colonoscopy - no alarm features. Will schedule  Epigastric/LUQ pain - new. Does not sound like GERD. WIll schedule EGD to further assess.  Will also schedule US   Follow up after procedures complete Never smoker

## 2024-10-12 ENCOUNTER — APPOINTMENT (OUTPATIENT)
Dept: ULTRASOUND IMAGING | Facility: CLINIC | Age: 49
End: 2024-10-12
Payer: COMMERCIAL

## 2024-10-12 ENCOUNTER — OUTPATIENT (OUTPATIENT)
Dept: OUTPATIENT SERVICES | Facility: HOSPITAL | Age: 49
LOS: 1 days | End: 2024-10-12
Payer: MEDICAID

## 2024-10-12 DIAGNOSIS — Z00.8 ENCOUNTER FOR OTHER GENERAL EXAMINATION: ICD-10-CM

## 2024-10-12 DIAGNOSIS — Z98.891 HISTORY OF UTERINE SCAR FROM PREVIOUS SURGERY: Chronic | ICD-10-CM

## 2024-10-12 DIAGNOSIS — Z98.890 OTHER SPECIFIED POSTPROCEDURAL STATES: Chronic | ICD-10-CM

## 2024-10-12 PROCEDURE — 76700 US EXAM ABDOM COMPLETE: CPT

## 2024-10-12 PROCEDURE — 76700 US EXAM ABDOM COMPLETE: CPT | Mod: 26

## 2024-10-14 ENCOUNTER — RESULT REVIEW (OUTPATIENT)
Age: 49
End: 2024-10-14

## 2024-10-14 ENCOUNTER — TRANSCRIPTION ENCOUNTER (OUTPATIENT)
Age: 49
End: 2024-10-14

## 2024-10-14 ENCOUNTER — OUTPATIENT (OUTPATIENT)
Dept: OUTPATIENT SERVICES | Facility: HOSPITAL | Age: 49
LOS: 1 days | Discharge: HOME | End: 2024-10-14
Payer: COMMERCIAL

## 2024-10-14 VITALS
OXYGEN SATURATION: 100 % | TEMPERATURE: 99 F | DIASTOLIC BLOOD PRESSURE: 74 MMHG | WEIGHT: 179.9 LBS | RESPIRATION RATE: 16 BRPM | HEIGHT: 65 IN | SYSTOLIC BLOOD PRESSURE: 116 MMHG | HEART RATE: 72 BPM

## 2024-10-14 VITALS
HEART RATE: 67 BPM | RESPIRATION RATE: 15 BRPM | SYSTOLIC BLOOD PRESSURE: 116 MMHG | DIASTOLIC BLOOD PRESSURE: 74 MMHG | OXYGEN SATURATION: 99 %

## 2024-10-14 DIAGNOSIS — Z00.00 ENCOUNTER FOR GENERAL ADULT MEDICAL EXAMINATION WITHOUT ABNORMAL FINDINGS: ICD-10-CM

## 2024-10-14 DIAGNOSIS — Z90.710 ACQUIRED ABSENCE OF BOTH CERVIX AND UTERUS: Chronic | ICD-10-CM

## 2024-10-14 DIAGNOSIS — Z98.890 OTHER SPECIFIED POSTPROCEDURAL STATES: Chronic | ICD-10-CM

## 2024-10-14 DIAGNOSIS — Z98.891 HISTORY OF UTERINE SCAR FROM PREVIOUS SURGERY: Chronic | ICD-10-CM

## 2024-10-14 DIAGNOSIS — R10.13 EPIGASTRIC PAIN: ICD-10-CM

## 2024-10-14 PROCEDURE — 88312 SPECIAL STAINS GROUP 1: CPT | Mod: 26

## 2024-10-14 PROCEDURE — 88305 TISSUE EXAM BY PATHOLOGIST: CPT | Mod: 26

## 2024-10-14 PROCEDURE — 43239 EGD BIOPSY SINGLE/MULTIPLE: CPT

## 2024-10-14 PROCEDURE — 45378 DIAGNOSTIC COLONOSCOPY: CPT

## 2024-10-14 NOTE — BRIEF OPERATIVE NOTE - OPERATION/FINDINGS
EGD and Colon report - see photos in paper chart  EGD  Indication - abdominal pain  Exam to d2  Esophagus and duodenum normal  Stomach normal, biopsied    Colon report  Indication screening  Exam to TI  Prep adequate  Withdrawal 6 minutes  Findings:  Scattered diverticulosis  Small internal hemorrhoids  Otherwise normal colonoscopy    Recommend repeat colonoscopy in 10 years

## 2024-10-14 NOTE — ASU DISCHARGE PLAN (ADULT/PEDIATRIC) - ***IN THE EVENT THAT YOU DEVELOP A COMPLICATION AND YOU ARE UNABLE TO REACH YOUR OWN PHYSICIAN, YOU MAY CONTACT:
Came in from endocrinology clinic for weakness fatigue and  hyperglycemia pt states that she stopped taking metformin and Humalog 4 weeks ago BGL over 500 at the clinic this am    Statement Selected

## 2024-10-15 LAB — SURGICAL PATHOLOGY STUDY: SIGNIFICANT CHANGE UP

## 2024-10-18 DIAGNOSIS — K64.8 OTHER HEMORRHOIDS: ICD-10-CM

## 2024-10-18 DIAGNOSIS — Z12.11 ENCOUNTER FOR SCREENING FOR MALIGNANT NEOPLASM OF COLON: ICD-10-CM

## 2024-10-18 DIAGNOSIS — Z79.890 HORMONE REPLACEMENT THERAPY: ICD-10-CM

## 2024-10-18 DIAGNOSIS — E03.9 HYPOTHYROIDISM, UNSPECIFIED: ICD-10-CM

## 2024-10-18 DIAGNOSIS — K57.30 DIVERTICULOSIS OF LARGE INTESTINE WITHOUT PERFORATION OR ABSCESS WITHOUT BLEEDING: ICD-10-CM

## 2024-10-29 ENCOUNTER — APPOINTMENT (OUTPATIENT)
Dept: GASTROENTEROLOGY | Facility: CLINIC | Age: 49
End: 2024-10-29

## 2025-01-17 ENCOUNTER — APPOINTMENT (OUTPATIENT)
Dept: ORTHOPEDIC SURGERY | Facility: CLINIC | Age: 50
End: 2025-01-17
Payer: COMMERCIAL

## 2025-01-17 DIAGNOSIS — M65.4 RADIAL STYLOID TENOSYNOVITIS [DE QUERVAIN]: ICD-10-CM

## 2025-01-17 DIAGNOSIS — G56.03 CARPAL TUNNEL SYNDROM,BILATERAL UPPER LIMBS: ICD-10-CM

## 2025-01-17 PROCEDURE — 73110 X-RAY EXAM OF WRIST: CPT | Mod: 50

## 2025-01-17 PROCEDURE — 99204 OFFICE O/P NEW MOD 45 MIN: CPT | Mod: 25

## 2025-01-17 PROCEDURE — 20550 NJX 1 TENDON SHEATH/LIGAMENT: CPT | Mod: RT

## 2025-02-14 ENCOUNTER — APPOINTMENT (OUTPATIENT)
Dept: PHYSICAL MEDICINE AND REHAB | Facility: CLINIC | Age: 50
End: 2025-02-14
Payer: COMMERCIAL

## 2025-02-14 DIAGNOSIS — G56.03 CARPAL TUNNEL SYNDROM,BILATERAL UPPER LIMBS: ICD-10-CM

## 2025-02-14 PROCEDURE — 95909 NRV CNDJ TST 5-6 STUDIES: CPT

## 2025-03-28 ENCOUNTER — OUTPATIENT (OUTPATIENT)
Dept: OUTPATIENT SERVICES | Facility: HOSPITAL | Age: 50
LOS: 1 days | End: 2025-03-28
Payer: COMMERCIAL

## 2025-03-28 ENCOUNTER — APPOINTMENT (OUTPATIENT)
Dept: ULTRASOUND IMAGING | Facility: IMAGING CENTER | Age: 50
End: 2025-03-28
Payer: COMMERCIAL

## 2025-03-28 ENCOUNTER — APPOINTMENT (OUTPATIENT)
Dept: MAMMOGRAPHY | Facility: IMAGING CENTER | Age: 50
End: 2025-03-28
Payer: COMMERCIAL

## 2025-03-28 ENCOUNTER — RESULT REVIEW (OUTPATIENT)
Age: 50
End: 2025-03-28

## 2025-03-28 DIAGNOSIS — Z90.710 ACQUIRED ABSENCE OF BOTH CERVIX AND UTERUS: Chronic | ICD-10-CM

## 2025-03-28 DIAGNOSIS — Z98.891 HISTORY OF UTERINE SCAR FROM PREVIOUS SURGERY: Chronic | ICD-10-CM

## 2025-03-28 DIAGNOSIS — R92.30 DENSE BREASTS, UNSPECIFIED: ICD-10-CM

## 2025-03-28 DIAGNOSIS — Z98.890 OTHER SPECIFIED POSTPROCEDURAL STATES: Chronic | ICD-10-CM

## 2025-03-28 PROCEDURE — 77067 SCR MAMMO BI INCL CAD: CPT | Mod: 26

## 2025-03-28 PROCEDURE — 76641 ULTRASOUND BREAST COMPLETE: CPT | Mod: 26,50

## 2025-03-28 PROCEDURE — 77063 BREAST TOMOSYNTHESIS BI: CPT | Mod: 26

## 2025-03-28 PROCEDURE — 77067 SCR MAMMO BI INCL CAD: CPT

## 2025-03-28 PROCEDURE — 77063 BREAST TOMOSYNTHESIS BI: CPT

## 2025-03-28 PROCEDURE — 76641 ULTRASOUND BREAST COMPLETE: CPT

## 2025-03-31 ENCOUNTER — NON-APPOINTMENT (OUTPATIENT)
Age: 50
End: 2025-03-31

## 2025-04-08 ENCOUNTER — APPOINTMENT (OUTPATIENT)
Dept: SURGICAL ONCOLOGY | Facility: CLINIC | Age: 50
End: 2025-04-08

## 2025-04-28 ENCOUNTER — APPOINTMENT (OUTPATIENT)
Dept: RADIOLOGY | Facility: IMAGING CENTER | Age: 50
End: 2025-04-28
Payer: COMMERCIAL

## 2025-04-28 ENCOUNTER — OUTPATIENT (OUTPATIENT)
Dept: OUTPATIENT SERVICES | Facility: HOSPITAL | Age: 50
LOS: 1 days | End: 2025-04-28
Payer: COMMERCIAL

## 2025-04-28 ENCOUNTER — APPOINTMENT (OUTPATIENT)
Dept: ULTRASOUND IMAGING | Facility: IMAGING CENTER | Age: 50
End: 2025-04-28
Payer: COMMERCIAL

## 2025-04-28 DIAGNOSIS — Z98.891 HISTORY OF UTERINE SCAR FROM PREVIOUS SURGERY: Chronic | ICD-10-CM

## 2025-04-28 DIAGNOSIS — Z90.710 ACQUIRED ABSENCE OF BOTH CERVIX AND UTERUS: Chronic | ICD-10-CM

## 2025-04-28 DIAGNOSIS — Z98.890 OTHER SPECIFIED POSTPROCEDURAL STATES: Chronic | ICD-10-CM

## 2025-04-28 DIAGNOSIS — Z00.8 ENCOUNTER FOR OTHER GENERAL EXAMINATION: ICD-10-CM

## 2025-04-28 PROCEDURE — 72110 X-RAY EXAM L-2 SPINE 4/>VWS: CPT | Mod: 26

## 2025-04-28 PROCEDURE — 76830 TRANSVAGINAL US NON-OB: CPT

## 2025-04-28 PROCEDURE — 72110 X-RAY EXAM L-2 SPINE 4/>VWS: CPT

## 2025-04-28 PROCEDURE — 76830 TRANSVAGINAL US NON-OB: CPT | Mod: 26

## 2025-05-08 ENCOUNTER — NON-APPOINTMENT (OUTPATIENT)
Age: 50
End: 2025-05-08

## 2025-05-08 ENCOUNTER — APPOINTMENT (OUTPATIENT)
Dept: SURGICAL ONCOLOGY | Facility: CLINIC | Age: 50
End: 2025-05-08
Payer: COMMERCIAL

## 2025-05-08 VITALS
HEART RATE: 71 BPM | TEMPERATURE: 98 F | HEIGHT: 65 IN | OXYGEN SATURATION: 97 % | DIASTOLIC BLOOD PRESSURE: 82 MMHG | SYSTOLIC BLOOD PRESSURE: 129 MMHG | WEIGHT: 184 LBS | BODY MASS INDEX: 30.66 KG/M2

## 2025-05-08 DIAGNOSIS — R92.30 DENSE BREASTS, UNSPECIFIED: ICD-10-CM

## 2025-05-08 PROCEDURE — 99214 OFFICE O/P EST MOD 30 MIN: CPT

## 2025-06-02 NOTE — ASU DISCHARGE PLAN (ADULT/PEDIATRIC) - NS MD DC FALL RISK RISK
For information on Fall & Injury Prevention, visit: [email or web address] OR  [email or web address] OR  https://www.cdc.gov/steadi/patient.html
Patient requests all Lab, Cardiology, and Radiology Results on their Discharge Instructions

## (undated) DEVICE — TROCAR COVIDIEN VERSAONE FIXATION CANNULA 5MM

## (undated) DEVICE — TUBING INSUFFLATION LAP FILTER 10FT

## (undated) DEVICE — ELCTR BOVIE PENCIL SMOKE EVACUATION

## (undated) DEVICE — BASIN SET SINGLE

## (undated) DEVICE — POSITIONER FOAM EGG CRATE ULNAR 2PCS (PINK)

## (undated) DEVICE — TROCAR SURGIQUEST AIRSEAL 8MMX100MM

## (undated) DEVICE — SOL IRR BAG NS 0.9% 3000ML

## (undated) DEVICE — GLV 6.5 PROTEXIS (WHITE)

## (undated) DEVICE — BITE BLOCK ADULT 20 X 27MM (GREEN)

## (undated) DEVICE — SUT VLOC 180 0 9" GS-21 GREEN

## (undated) DEVICE — TROCAR COVIDIEN VERSAPORT BLADELESS OPTICAL 5MM STANDARD

## (undated) DEVICE — DRSG OPSITE 2.5 X 2"

## (undated) DEVICE — SUT ETHIBOND EXCEL 2-0 36" SH

## (undated) DEVICE — POSITIONER PINK PAD PIGAZZI SYSTEM

## (undated) DEVICE — Device

## (undated) DEVICE — TRAP SPECIMEN SPUTUM 40CC

## (undated) DEVICE — TUBING SUCTION NONCONDUCTIVE 6MM X 12FT

## (undated) DEVICE — KIT ENDO PROCEDURE CUST W/VLV

## (undated) DEVICE — TROCAR COVIDIEN VERSAONE BLADELESS FIXATION 11MM STANDARD

## (undated) DEVICE — GLV 7 PROTEXIS (BLUE)

## (undated) DEVICE — PACK D&C

## (undated) DEVICE — DRSG DERMABOND 0.7ML

## (undated) DEVICE — POSITIONER STRAP ARMBOARD VELCRO TS-30

## (undated) DEVICE — PACK GENERAL LAPAROSCOPY

## (undated) DEVICE — TUBING HYDRO-SURG PLUS IRRIGATOR W SMOKEVAC & PROBE

## (undated) DEVICE — BIOPSY FORCEP RADIAL JAW 4 STANDARD WITH NEEDLE

## (undated) DEVICE — SNARE POLYP SINGUL LG OVAL 230CM

## (undated) DEVICE — VENODYNE/SCD SLEEVE CALF MEDIUM

## (undated) DEVICE — PREP BETADINE SPONGE STICKS

## (undated) DEVICE — TUBING OLYMPUS INSUFFLATION

## (undated) DEVICE — SUT GORETEX CV-2 (0) 36" PH-24

## (undated) DEVICE — SUT MONOCRYL 4-0 27" PS-2 UNDYED

## (undated) DEVICE — TIP METZENBAUM SCISSOR MONOPOLAR ENDOCUT (ORANGE)

## (undated) DEVICE — PACK PERI GYN

## (undated) DEVICE — TUBING HYBRID CO2

## (undated) DEVICE — TUBING IV SET GRAVITY 3Y 100" MACRO

## (undated) DEVICE — FOLEY TRAY 16FR 5CC LF UMETER CLOSED

## (undated) DEVICE — GOWN IMPERV BREATHABLE XL

## (undated) DEVICE — BLADE SURGICAL #15 CARBON

## (undated) DEVICE — ENDOCATCH 10MM SPECIMEN POUCH

## (undated) DEVICE — TUBING MEDI-VAC W MAXIGRIP CONNECTORS 1/4"X6'

## (undated) DEVICE — CONTAINER FORMALIN 80ML YELLOW

## (undated) DEVICE — WARMING BLANKET UPPER ADULT

## (undated) DEVICE — TROCAR SURGIQUEST AIRSEAL 8MMX120MM

## (undated) DEVICE — TUBING AIRSEAL TRI-LUMEN FILTERED

## (undated) DEVICE — INSUFFLATION NDL ETHICON ENDOPATH VERESS 14G 120MM

## (undated) DEVICE — LIGASURE MARYLAND 37CM

## (undated) DEVICE — SUT VICRYL 0 27" UR-6